# Patient Record
Sex: MALE | Race: WHITE | Employment: FULL TIME | URBAN - METROPOLITAN AREA
[De-identification: names, ages, dates, MRNs, and addresses within clinical notes are randomized per-mention and may not be internally consistent; named-entity substitution may affect disease eponyms.]

---

## 2019-03-26 ENCOUNTER — APPOINTMENT (OUTPATIENT)
Dept: GENERAL RADIOLOGY | Age: 64
DRG: 084 | End: 2019-03-26
Payer: COMMERCIAL

## 2019-03-26 ENCOUNTER — APPOINTMENT (OUTPATIENT)
Dept: CT IMAGING | Age: 64
DRG: 084 | End: 2019-03-26
Payer: COMMERCIAL

## 2019-03-26 ENCOUNTER — HOSPITAL ENCOUNTER (INPATIENT)
Age: 64
LOS: 7 days | Discharge: HOME | DRG: 084 | End: 2019-04-02
Attending: EMERGENCY MEDICINE | Admitting: SURGERY
Payer: COMMERCIAL

## 2019-03-26 DIAGNOSIS — R42 DIZZINESS: ICD-10-CM

## 2019-03-26 DIAGNOSIS — R55 SYNCOPE AND COLLAPSE: ICD-10-CM

## 2019-03-26 DIAGNOSIS — S06.5XAA SUBDURAL HEMATOMA: Primary | ICD-10-CM

## 2019-03-26 DIAGNOSIS — S02.119A FRACTURE OF OCCIPITAL BONE OF SKULL WITH LOSS OF CONSCIOUSNESS (HCC): ICD-10-CM

## 2019-03-26 DIAGNOSIS — S06.9X9A FRACTURE OF OCCIPITAL BONE OF SKULL WITH LOSS OF CONSCIOUSNESS (HCC): ICD-10-CM

## 2019-03-26 LAB
ANION GAP SERPL CALCULATED.3IONS-SCNC: 12 MMOL/L (ref 7–16)
APTT: 25.6 SEC (ref 24.5–35.1)
BASOPHILS ABSOLUTE: 0 E9/L (ref 0–0.2)
BASOPHILS RELATIVE PERCENT: 0.3 % (ref 0–2)
BUN BLDV-MCNC: 13 MG/DL (ref 8–23)
CALCIUM SERPL-MCNC: 9 MG/DL (ref 8.6–10.2)
CHLORIDE BLD-SCNC: 101 MMOL/L (ref 98–107)
CO2: 24 MMOL/L (ref 22–29)
CREAT SERPL-MCNC: 0.8 MG/DL (ref 0.7–1.2)
EOSINOPHILS ABSOLUTE: 0 E9/L (ref 0.05–0.5)
EOSINOPHILS RELATIVE PERCENT: 0 % (ref 0–6)
GFR AFRICAN AMERICAN: >60
GFR NON-AFRICAN AMERICAN: >60 ML/MIN/1.73
GLUCOSE BLD-MCNC: 156 MG/DL (ref 74–99)
HCT VFR BLD CALC: 43.3 % (ref 37–54)
HEMOGLOBIN: 14.5 G/DL (ref 12.5–16.5)
INR BLD: 1
LACTIC ACID: 1.9 MMOL/L (ref 0.5–2.2)
LYMPHOCYTES ABSOLUTE: 0.42 E9/L (ref 1.5–4)
LYMPHOCYTES RELATIVE PERCENT: 4.3 % (ref 20–42)
MAGNESIUM: 2 MG/DL (ref 1.6–2.6)
MCH RBC QN AUTO: 31.3 PG (ref 26–35)
MCHC RBC AUTO-ENTMCNC: 33.5 % (ref 32–34.5)
MCV RBC AUTO: 93.5 FL (ref 80–99.9)
MONOCYTES ABSOLUTE: 0.42 E9/L (ref 0.1–0.95)
MONOCYTES RELATIVE PERCENT: 3.5 % (ref 2–12)
NEUTROPHILS ABSOLUTE: 9.66 E9/L (ref 1.8–7.3)
NEUTROPHILS RELATIVE PERCENT: 92.2 % (ref 43–80)
PDW BLD-RTO: 13.1 FL (ref 11.5–15)
PLATELET # BLD: 232 E9/L (ref 130–450)
PMV BLD AUTO: 10.1 FL (ref 7–12)
POTASSIUM REFLEX MAGNESIUM: 3.5 MMOL/L (ref 3.5–5)
PROTHROMBIN TIME: 11.4 SEC (ref 9.3–12.4)
RBC # BLD: 4.63 E12/L (ref 3.8–5.8)
SODIUM BLD-SCNC: 137 MMOL/L (ref 132–146)
TROPONIN: <0.01 NG/ML (ref 0–0.03)
WBC # BLD: 10.5 E9/L (ref 4.5–11.5)

## 2019-03-26 PROCEDURE — 72125 CT NECK SPINE W/O DYE: CPT

## 2019-03-26 PROCEDURE — 96375 TX/PRO/DX INJ NEW DRUG ADDON: CPT

## 2019-03-26 PROCEDURE — 2500000003 HC RX 250 WO HCPCS: Performed by: STUDENT IN AN ORGANIZED HEALTH CARE EDUCATION/TRAINING PROGRAM

## 2019-03-26 PROCEDURE — 6360000002 HC RX W HCPCS: Performed by: EMERGENCY MEDICINE

## 2019-03-26 PROCEDURE — 6370000000 HC RX 637 (ALT 250 FOR IP): Performed by: SURGERY

## 2019-03-26 PROCEDURE — 96365 THER/PROPH/DIAG IV INF INIT: CPT

## 2019-03-26 PROCEDURE — 90471 IMMUNIZATION ADMIN: CPT | Performed by: STUDENT IN AN ORGANIZED HEALTH CARE EDUCATION/TRAINING PROGRAM

## 2019-03-26 PROCEDURE — 73070 X-RAY EXAM OF ELBOW: CPT

## 2019-03-26 PROCEDURE — 80048 BASIC METABOLIC PNL TOTAL CA: CPT

## 2019-03-26 PROCEDURE — 85610 PROTHROMBIN TIME: CPT

## 2019-03-26 PROCEDURE — 99291 CRITICAL CARE FIRST HOUR: CPT | Performed by: SURGERY

## 2019-03-26 PROCEDURE — 85730 THROMBOPLASTIN TIME PARTIAL: CPT

## 2019-03-26 PROCEDURE — 84484 ASSAY OF TROPONIN QUANT: CPT

## 2019-03-26 PROCEDURE — 96376 TX/PRO/DX INJ SAME DRUG ADON: CPT

## 2019-03-26 PROCEDURE — 71045 X-RAY EXAM CHEST 1 VIEW: CPT

## 2019-03-26 PROCEDURE — 6360000002 HC RX W HCPCS: Performed by: STUDENT IN AN ORGANIZED HEALTH CARE EDUCATION/TRAINING PROGRAM

## 2019-03-26 PROCEDURE — 99254 IP/OBS CNSLTJ NEW/EST MOD 60: CPT | Performed by: NEUROLOGICAL SURGERY

## 2019-03-26 PROCEDURE — 90715 TDAP VACCINE 7 YRS/> IM: CPT | Performed by: STUDENT IN AN ORGANIZED HEALTH CARE EDUCATION/TRAINING PROGRAM

## 2019-03-26 PROCEDURE — 70450 CT HEAD/BRAIN W/O DYE: CPT

## 2019-03-26 PROCEDURE — 72170 X-RAY EXAM OF PELVIS: CPT

## 2019-03-26 PROCEDURE — 99285 EMERGENCY DEPT VISIT HI MDM: CPT

## 2019-03-26 PROCEDURE — 83605 ASSAY OF LACTIC ACID: CPT

## 2019-03-26 PROCEDURE — 83735 ASSAY OF MAGNESIUM: CPT

## 2019-03-26 PROCEDURE — 36415 COLL VENOUS BLD VENIPUNCTURE: CPT

## 2019-03-26 PROCEDURE — 2000000000 HC ICU R&B

## 2019-03-26 PROCEDURE — 6370000000 HC RX 637 (ALT 250 FOR IP): Performed by: STUDENT IN AN ORGANIZED HEALTH CARE EDUCATION/TRAINING PROGRAM

## 2019-03-26 PROCEDURE — 2580000003 HC RX 258: Performed by: SURGERY

## 2019-03-26 PROCEDURE — 85025 COMPLETE CBC W/AUTO DIFF WBC: CPT

## 2019-03-26 PROCEDURE — 2580000003 HC RX 258: Performed by: STUDENT IN AN ORGANIZED HEALTH CARE EDUCATION/TRAINING PROGRAM

## 2019-03-26 PROCEDURE — 87081 CULTURE SCREEN ONLY: CPT

## 2019-03-26 PROCEDURE — 93005 ELECTROCARDIOGRAM TRACING: CPT | Performed by: STUDENT IN AN ORGANIZED HEALTH CARE EDUCATION/TRAINING PROGRAM

## 2019-03-26 RX ORDER — LABETALOL HYDROCHLORIDE 5 MG/ML
10 INJECTION, SOLUTION INTRAVENOUS ONCE
Status: COMPLETED | OUTPATIENT
Start: 2019-03-26 | End: 2019-03-26

## 2019-03-26 RX ORDER — 0.9 % SODIUM CHLORIDE 0.9 %
1000 INTRAVENOUS SOLUTION INTRAVENOUS ONCE
Status: COMPLETED | OUTPATIENT
Start: 2019-03-26 | End: 2019-03-26

## 2019-03-26 RX ORDER — OXYCODONE HYDROCHLORIDE AND ACETAMINOPHEN 5; 325 MG/1; MG/1
2 TABLET ORAL EVERY 4 HOURS PRN
Status: DISCONTINUED | OUTPATIENT
Start: 2019-03-26 | End: 2019-03-26

## 2019-03-26 RX ORDER — SODIUM CHLORIDE 0.9 % (FLUSH) 0.9 %
10 SYRINGE (ML) INJECTION EVERY 12 HOURS SCHEDULED
Status: DISCONTINUED | OUTPATIENT
Start: 2019-03-26 | End: 2019-04-02 | Stop reason: HOSPADM

## 2019-03-26 RX ORDER — SODIUM CHLORIDE 9 MG/ML
INJECTION, SOLUTION INTRAVENOUS CONTINUOUS
Status: DISCONTINUED | OUTPATIENT
Start: 2019-03-26 | End: 2019-03-27

## 2019-03-26 RX ORDER — FOLIC ACID 5 MG/ML
1 INJECTION, SOLUTION INTRAMUSCULAR; INTRAVENOUS; SUBCUTANEOUS DAILY
Status: DISCONTINUED | OUTPATIENT
Start: 2019-03-26 | End: 2019-04-02 | Stop reason: HOSPADM

## 2019-03-26 RX ORDER — NICOTINE 21 MG/24HR
1 PATCH, TRANSDERMAL 24 HOURS TRANSDERMAL DAILY
Status: DISCONTINUED | OUTPATIENT
Start: 2019-03-26 | End: 2019-04-02 | Stop reason: HOSPADM

## 2019-03-26 RX ORDER — ACETAMINOPHEN 325 MG/1
650 TABLET ORAL EVERY 4 HOURS
Status: DISCONTINUED | OUTPATIENT
Start: 2019-03-26 | End: 2019-03-31

## 2019-03-26 RX ORDER — OXYCODONE HYDROCHLORIDE 5 MG/1
5 TABLET ORAL EVERY 4 HOURS PRN
Status: DISCONTINUED | OUTPATIENT
Start: 2019-03-26 | End: 2019-04-02 | Stop reason: HOSPADM

## 2019-03-26 RX ORDER — LEVETIRACETAM 10 MG/ML
1000 INJECTION INTRAVASCULAR ONCE
Status: COMPLETED | OUTPATIENT
Start: 2019-03-26 | End: 2019-03-26

## 2019-03-26 RX ORDER — IBUPROFEN 400 MG/1
800 TABLET ORAL
Status: DISCONTINUED | OUTPATIENT
Start: 2019-03-26 | End: 2019-03-27

## 2019-03-26 RX ORDER — ONDANSETRON 2 MG/ML
4 INJECTION INTRAMUSCULAR; INTRAVENOUS EVERY 6 HOURS PRN
Status: DISCONTINUED | OUTPATIENT
Start: 2019-03-26 | End: 2019-04-02 | Stop reason: HOSPADM

## 2019-03-26 RX ORDER — THIAMINE HYDROCHLORIDE 100 MG/ML
100 INJECTION, SOLUTION INTRAMUSCULAR; INTRAVENOUS DAILY
Status: DISCONTINUED | OUTPATIENT
Start: 2019-03-26 | End: 2019-04-02 | Stop reason: HOSPADM

## 2019-03-26 RX ORDER — LEVETIRACETAM 500 MG/1
500 TABLET ORAL 2 TIMES DAILY
Status: DISCONTINUED | OUTPATIENT
Start: 2019-03-27 | End: 2019-03-27

## 2019-03-26 RX ORDER — ACETAMINOPHEN 325 MG/1
650 TABLET ORAL EVERY 4 HOURS PRN
Status: DISCONTINUED | OUTPATIENT
Start: 2019-03-26 | End: 2019-03-26

## 2019-03-26 RX ORDER — SODIUM CHLORIDE 0.9 % (FLUSH) 0.9 %
10 SYRINGE (ML) INJECTION PRN
Status: DISCONTINUED | OUTPATIENT
Start: 2019-03-26 | End: 2019-04-02 | Stop reason: HOSPADM

## 2019-03-26 RX ORDER — OXYCODONE HYDROCHLORIDE AND ACETAMINOPHEN 5; 325 MG/1; MG/1
1 TABLET ORAL EVERY 4 HOURS PRN
Status: DISCONTINUED | OUTPATIENT
Start: 2019-03-26 | End: 2019-03-26

## 2019-03-26 RX ADMIN — LEVETIRACETAM 1000 MG: 10 INJECTION INTRAVENOUS at 16:26

## 2019-03-26 RX ADMIN — SODIUM CHLORIDE: 9 INJECTION, SOLUTION INTRAVENOUS at 18:01

## 2019-03-26 RX ADMIN — OXYCODONE HYDROCHLORIDE 5 MG: 5 TABLET ORAL at 22:18

## 2019-03-26 RX ADMIN — OXYCODONE AND ACETAMINOPHEN 2 TABLET: 5; 325 TABLET ORAL at 18:09

## 2019-03-26 RX ADMIN — THIAMINE HYDROCHLORIDE 100 MG: 100 INJECTION, SOLUTION INTRAMUSCULAR; INTRAVENOUS at 19:39

## 2019-03-26 RX ADMIN — ACETAMINOPHEN 650 MG: 325 TABLET, FILM COATED ORAL at 23:59

## 2019-03-26 RX ADMIN — ACETAMINOPHEN 650 MG: 325 TABLET, FILM COATED ORAL at 19:39

## 2019-03-26 RX ADMIN — LABETALOL HYDROCHLORIDE 10 MG: 5 INJECTION INTRAVENOUS at 17:10

## 2019-03-26 RX ADMIN — FOLIC ACID 1 MG: 5 INJECTION, SOLUTION INTRAMUSCULAR; INTRAVENOUS; SUBCUTANEOUS at 21:27

## 2019-03-26 RX ADMIN — SODIUM CHLORIDE 1000 ML: 9 INJECTION, SOLUTION INTRAVENOUS at 16:27

## 2019-03-26 RX ADMIN — LABETALOL HYDROCHLORIDE 10 MG: 5 INJECTION INTRAVENOUS at 16:26

## 2019-03-26 RX ADMIN — TETANUS TOXOID, REDUCED DIPHTHERIA TOXOID AND ACELLULAR PERTUSSIS VACCINE, ADSORBED 0.5 ML: 5; 2.5; 8; 8; 2.5 SUSPENSION INTRAMUSCULAR at 17:17

## 2019-03-26 ASSESSMENT — ENCOUNTER SYMPTOMS
TROUBLE SWALLOWING: 0
COLOR CHANGE: 0
EYE PAIN: 0
BACK PAIN: 0
PHOTOPHOBIA: 0
SHORTNESS OF BREATH: 0
ABDOMINAL PAIN: 0
CHEST TIGHTNESS: 0
ABDOMINAL DISTENTION: 0

## 2019-03-26 ASSESSMENT — PAIN SCALES - GENERAL
PAINLEVEL_OUTOF10: 0
PAINLEVEL_OUTOF10: 7
PAINLEVEL_OUTOF10: 7
PAINLEVEL_OUTOF10: 6
PAINLEVEL_OUTOF10: 8

## 2019-03-26 ASSESSMENT — PAIN DESCRIPTION - DESCRIPTORS: DESCRIPTORS: ACHING;DISCOMFORT;HEADACHE

## 2019-03-26 ASSESSMENT — PAIN DESCRIPTION - LOCATION: LOCATION: HEAD;SHOULDER

## 2019-03-26 NOTE — H&P
TRAUMA HISTORY & PHYSICAL  Resident   3/26/2019  4:44 PM    Chief Complaint   Patient presents with    Dizziness     was at work when he was changing a part on a car when the next thing he remembers is waking up on the ground. - thinners +loc         HPI: Talon Villagran is a 66 y.o. male who presents as a trauma consult from home for evaluation of SDH. He was working on his truck when he had a syncopal episode and woke up on the ground. He does not know what happened. He complains of 7/10 headache, nausea and left elbow pain. He denies any blood thinners. He does have a history of prostate cancer, benign tumor in chest? And kidney surgery as a child.       PRIMARY SURVEY    AIRWAY:   Airway normal  EMS ETT Absent   Noisy respirations Absent   Retractions: Absent   Vomiting/bleeding: Absent     BREATHING:    Midaxillary breath sound left:  Present  Midaxillary breath sound right: Present  Cough sound intensity:  Good  Respiratory rate: 16  FiO2: RA  SpO2: 96  SMI:     CIRCULATION:   Femerol pulse rate: within normal limits  Femerol pulse intensity: present  Palpebral conjunctiva: Pink     BP      P     SpO2       T      F    Patient Vitals for the past 8 hrs:   BP Temp Pulse Resp SpO2 Height Weight   03/26/19 1624 (!) 166/107 -- 101 22 96 % -- --   03/26/19 1518 (!) 184/112 97.8 °F (36.6 °C) 112 18 94 % 5' 7\" (1.702 m) 230 lb (104.3 kg)       FAST EXAM: Not performed    Central Nervous System    GCS Initial 15 minutes   Eye  Motor  Verbal 4 - Opens eyes on own  6 - Follows simple motor commands  5 - Alert and oriented 4 - Opens eyes on own  6 - Follows simple motor commands  5 - Alert and oriented     Neuromuscular blockade: No  Pupil size:  Left 3 mm    Right 3 mm  Pupil reaction: Yes  Wiggles fingers: Left Yes Right Yes  Wiggles toes: Left Yes    Right Yes    Hand grasp:   Left normal       Right normal  Plantar flexion: Left normal     Right normal  Loss of consciousness: Yes    History Obtained From:  patient  Private Medical Doctor: No primary care provider on file. Pre-exisiting Medical History:  yes    Conditions:  has no past medical history on file. Medications:   Prior to Admission medications    Not on File       Allergies: Allergies   Allergen Reactions    Pcn [Penicillins]        Social History:   Social History     Tobacco Use    Smoking status: Current Every Day Smoker    Smokeless tobacco: Never Used   Substance Use Topics    Alcohol use: Yes       Past Surgical History:   has no past surgical history on file.     NSAID use in last 72 hours: no  Taken PCN in past:  unknown  Last food/drink: AM  Last tetanus: unknown    Complaints:     Head: moderate  Neck: mild  Chest: no  Back: no  Abdomen: no  Extremities: L elbow pain  Comments:       SECONDARY SURVEY  Head/scalp: small abrasion to posterior scalp    Face: No soft tissue injuries    Eyes/ears/nose: EOMI, PEERL, no soft tissue injuries    Pharynx/mouth:  No soft tissue injury, no malocclusion    Neck: No soft tissue injuries   Cervical spine tenderness: mild  ROM:  Cervical collar placed    Chest wall:  CTAB, no crepitus appreciated, no soft tissue injuries     Heart:  RRR    Abdomen: Soft, non-distended, no soft tissue injuries   Tenderness:  none    Pelvis: Stable, no soft tissue injuries, no pain with hip flexion   Tenderness: none    Thoracolumbar spine: No soft tissue injuries, no step-offs  Tenderness:  none    Extremities: left le  Sensory normal  Motor normal    Distal Pulses  Left arm Normal  Right arm Normal  Left leg Normal  Right leg Normal    Capillary refill   Left arm normal  Right arm normal  Left leg normal   Right leg normal      Procedures in ED:  None      Radiology:     Xr Elbow Left (2 Views)    Result Date: 3/26/2019  Patient MRN:  [id number] : 1955 Age: 66 years Gender: Male Order Date:  3/26/2019 3:45 PM EXAM: XR ELBOW LEFT (2 VIEWS) NUMBER OF IMAGES:  2 INDICATION:  Left elbow pain following fall with syncopal episode Fulle    Plan of Treatment:  Admit to Neuro ICU  Repeat head CT  CT neck  Pelvic x-ray  INR, TEG, Platelet mapping    Plan discussed with Dr. Silvia Haskins.     Madhuri Guerra on 3/26/2019 at 4:44 PM

## 2019-03-26 NOTE — ED NOTES
Bed: 14B-14  Expected date:   Expected time:   Means of arrival:   Comments:     Nia Tobin RN  03/26/19 9893

## 2019-03-26 NOTE — ED PROVIDER NOTES
Negative for abdominal distention and abdominal pain. Genitourinary: Negative for dysuria and flank pain. Musculoskeletal: Negative for back pain, neck pain and neck stiffness. Skin: Negative for color change, pallor and rash. Neurological: Positive for dizziness, syncope and headaches. Negative for tremors, seizures, speech difficulty, weakness and light-headedness. Psychiatric/Behavioral: Negative for agitation and confusion. Physical Exam   Constitutional: He is oriented to person, place, and time. He appears well-developed and well-nourished. No distress. HENT:   Head: Normocephalic. Nose: Nose normal.   Mouth/Throat: Oropharynx is clear and moist.   Hematoma to posterior scalp   Eyes: Conjunctivae and EOM are normal.   Neck: Neck supple. Cardiovascular: Regular rhythm and intact distal pulses. Tachycardic  Scar on sternum from benign tumor removal   Pulmonary/Chest: Effort normal and breath sounds normal. No respiratory distress. He has no wheezes. He exhibits no tenderness. Abdominal: Soft. Bowel sounds are normal. He exhibits distension. There is no tenderness. There is no rebound and no guarding. Musculoskeletal: He exhibits tenderness (left elbow ). He exhibits no edema or deformity. Normal ROM UE/LE     Neurological: He is alert and oriented to person, place, and time. No cranial nerve deficit or sensory deficit. He exhibits normal muscle tone. Coordination normal.   Skin: Skin is warm and dry. Capillary refill takes less than 2 seconds. He is not diaphoretic. Psychiatric: He has a normal mood and affect. His behavior is normal.   Nursing note and vitals reviewed. Procedures    MDM  Number of Diagnoses or Management Options  Dizziness:   Subdural hematoma [facility]):   Syncope and collapse:   Diagnosis management comments: Lettie Sacks presented following syncopal episode. Patient was found to have subdural hematoma with midline shift.  Patient was given labetalol 10mg x2 in ED, BP was 155/96 in ED just after second labetalol before transfer to the ICU. Patient was given 1g Keppra. Patient will be admitted to trauma surgery in SICU and Dr. Brittanie Jeong will consult. Patient has been stable in ED without change in neruo status in ED. Results discussed with patient.               --------------------------------------------- PAST HISTORY ---------------------------------------------  Past Medical History:  has no past medical history on file. Past Surgical History:  has no past surgical history on file. Social History:  reports that he has been smoking. He has never used smokeless tobacco. He reports that he drinks alcohol. Family History: family history is not on file. The patients home medications have been reviewed. Allergies: Pcn [penicillins]    -------------------------------------------------- RESULTS -------------------------------------------------    LABS:  Results for orders placed or performed during the hospital encounter of 03/26/19   CBC auto differential   Result Value Ref Range    WBC 10.5 4.5 - 11.5 E9/L    RBC 4.63 3.80 - 5.80 E12/L    Hemoglobin 14.5 12.5 - 16.5 g/dL    Hematocrit 43.3 37.0 - 54.0 %    MCV 93.5 80.0 - 99.9 fL    MCH 31.3 26.0 - 35.0 pg    MCHC 33.5 32.0 - 34.5 %    RDW 13.1 11.5 - 15.0 fL    Platelets 644 939 - 398 E9/L    MPV 10.1 7.0 - 12.0 fL   EKG 12 Lead   Result Value Ref Range    Ventricular Rate 96 BPM    Atrial Rate 96 BPM    P-R Interval 156 ms    QRS Duration 88 ms    Q-T Interval 362 ms    QTc Calculation (Bazett) 457 ms    P Axis 49 degrees    R Axis 12 degrees    T Axis -27 degrees       RADIOLOGY:  CT Head WO Contrast   Final Result      1. Right frontal and temporal subdural hematoma with leftward midline   shift of 7 mm. 2. Nondepressed occipital bone fracture. Findings called to Dr. Lay Counts at 1900 [facility] PM 3/26/2019. ALERT:  CRITICAL RESULT.       XR CHEST PORTABLE    (Results Pending)   XR ELBOW LEFT (2 VIEWS) (Results Pending)       EKG: This EKG is signed and interpreted by me. Rate: 96  Rhythm: Sinus  Interpretation: possible left atrial enlargement ST/T abnormality   Comparison: no previous EKG      ------------------------- NURSING NOTES AND VITALS REVIEWED ---------------------------  Date / Time Roomed:  3/26/2019  3:17 PM  ED Bed Assignment:  14B/14B-14    The nursing notes within the ED encounter and vital signs as below have been reviewed. Patient Vitals for the past 24 hrs:   BP Temp Pulse Resp SpO2 Height Weight   03/26/19 1624 (!) 166/107 -- 101 22 96 % -- --   03/26/19 1518 (!) 184/112 97.8 °F (36.6 °C) 112 18 94 % 5' 7\" (1.702 m) 230 lb (104.3 kg)       Oxygen Saturation Interpretation: Normal    ------------------------------------------ PROGRESS NOTES ------------------------------------------  Re-evaluation(s):  Time: 425pm  Patients symptoms show no change  Repeat physical examination is not changed    Counseling:  I have spoken with the patient and discussed todays results, in addition to providing specific details for the plan of care and counseling regarding the diagnosis and prognosis. Their questions are answered at this time and they are agreeable with the plan of admission.    --------------------------------- ADDITIONAL PROVIDER NOTES ---------------------------------  Consultations:  Spoke with Dr. Robby Kamara, neurosurgery and Trauma service. Discussed case. They will admit the patient. This patient's ED course included: a personal history and physicial examination, re-evaluation prior to disposition, multiple bedside re-evaluations, IV medications, cardiac monitoring and continuous pulse oximetry    This patient has remained hemodynamically stable during their ED course. Diagnosis:  1. Subdural hematoma ([address]    2. Syncope and collapse    3.  Dizziness      Please note that the withdrawal or failure to initiate urgent interventions for this patient would likely result in a life threatening deterioration or permanent disability. Accordingly this patient received 35 minutes of critical care time, excluding separately billable procedures. Disposition:  Patient's disposition: Admit to neuro-ICU  Patient's condition is stable.               Airam Prather MD  03/26/19 8975

## 2019-03-26 NOTE — CONSULTS
NEUROSURGERY CONSULTATION     Lazarus Kansky is being referred by Dr. Devi Trujillo as a consultation for evaluation of SDH after fall     Chief Complaint   Patient presents with    Dizziness     was at work when he was changing a part on a car when the next thing he remembers is waking up on the ground. - thinners +loc       Chief Complaint: SDH, dizziness    HPI:   Lazarus Kansky is a 66 y.o.  male who has history of prostate cancer x 2 years ago s/p surgery and radiation and hx benign tumor removed from his chest 10 years ago. Pt presents to the ED after a syncopal episode when he was working on his truck. He is c/o HA, left elbow pain, and left jaw pain. Complaint is persistent, moderate in severity, and worsened by nothing. Pt states he remembers waking up in his truck but doesn't know how he got there. He is not on any anticoagulation. CT head demonstrates an 8mm thick right frontal and temporal SDH with sulcal effacement and 7mm midline shift for which neurosurgery was consulted. Denies weakness, numbness, tingling, N/V, gait or speech issues, SOB, or chest pain. History reviewed. No pertinent past medical history. History reviewed. No pertinent surgical history. History reviewed. No pertinent family history. Social History     Socioeconomic History    Marital status: Single     Spouse name: Not on file    Number of children: Not on file    Years of education: Not on file    Highest education level: Not on file   Occupational History    Not on file   Social Needs    Financial resource strain: Not on file    Food insecurity:     Worry: Not on file     Inability: Not on file    Transportation needs:     Medical: Not on file     Non-medical: Not on file   Tobacco Use    Smoking status: Current Every Day Smoker    Smokeless tobacco: Never Used   Substance and Sexual Activity    Alcohol use:  Yes    Drug use: Not on file    Sexual activity: Not on file   Lifestyle    Physical activity:     Days per week: Not on file     Minutes per session: Not on file    Stress: Not on file   Relationships    Social connections:     Talks on phone: Not on file     Gets together: Not on file     Attends Islam service: Not on file     Active member of club or organization: Not on file     Attends meetings of clubs or organizations: Not on file     Relationship status: Not on file    Intimate partner violence:     Fear of current or ex partner: Not on file     Emotionally abused: Not on file     Physically abused: Not on file     Forced sexual activity: Not on file   Other Topics Concern    Not on file   Social History Narrative    Not on file       Medications:   Current Facility-Administered Medications   Medication Dose Route Frequency Provider Last Rate Last Dose    [START ON 3/27/2019] levETIRAcetam (KEPPRA) tablet 500 mg  500 mg Oral BID Kong Reddy MD         No current outpatient medications on file. Allergies:    Pcn [penicillins]       Review of Systems   Constitutional: Negative for fever. HENT: Negative for trouble swallowing. Eyes: Negative for photophobia. Respiratory: Negative for shortness of breath. Cardiovascular: Negative for chest pain. Gastrointestinal: Negative for abdominal pain. Endocrine: Negative for heat intolerance. Genitourinary: Negative for flank pain. Musculoskeletal: Negative for back pain, gait problem, myalgias and neck pain. Left elbow pain    Skin: Negative for wound. Neurological: Positive for dizziness, syncope and headaches. Negative for weakness and numbness. Psychiatric/Behavioral: Negative for confusion. Physical Exam   Constitutional: He appears well-developed and well-nourished. HENT:   Head: Normocephalic. Eyes: Pupils are equal, round, and reactive to light. Conjunctivae and EOM are normal.   Neck: No tracheal deviation present. Currently in trauma C collar   Cardiovascular: Normal rate.    Pulmonary/Chest: Effort normal. Abdominal: He exhibits no distension. Musculoskeletal: Normal range of motion. Left elbow tenderness to palpation    Neurological:   Alert and oriented x3  CN3-12 intact  No pronator drift  Motor strength full  Sensation intact to light touch  Reflexes normal    Skin: Skin is warm and dry. Psychiatric: Thought content normal.        BP (!) 166/107   Pulse 101   Temp 97.8 °F (36.6 °C)   Resp 22   Ht 5' 7\" (1.702 m)   Wt 230 lb (104.3 kg)   SpO2 96%   BMI 36.02 kg/m²        Assessment:   New problem: right frontal-temporal SDH s/p fall    3/26 HCT: right frontal and temporal SDH measuring 8mm thick with local sulcal effacement and 7mm midline shift. The basal cisterns are patent. Non-depressed occipital bone fx. He is currently asymptomatic from the subdural hemorrhage. Plan:  -No surgical intervention needed if neurological exam remains without any deficits. -SBP goal < 140   -Repeat head CT in 6 hours  -Serial neurological exams  -Follow up in neurosurgery clinic in 1 month with repeat head CT   -keppra 500mg bid for week. -he is currently in a trauma C collar. Obtain CT C spine. Clear collar clinically if no fx on CT C spine.   -check INR/PTT      Electronically signed by Sarah Magana PA-C on 3/26/2019 at 5:23 PM       I independently saw, evaluated, and examined the patient. Agree with plan outlined above. I independently reviewed the patient's imaging and laboratory results. Dionne Colunga is 66years old. He presented with a syncopal episode. He was found passed out in his truck. He hit the back of his head. He suffered an occipital bone fracture. He has a right frontotemporal subdural hematoma measuring 8 mm in thickness. There is 7 mm of midline shift. The basal cisterns are patent. He is currently neurologically intact. No current surgical intervention is planned. Continue to follow exam.  He states that he was not taking any anticoagulation or any antiplatelet agents. Platelets 271.  Electronically signed by Chayito Barrera MD on 3/26/2019 at 8:28 PM

## 2019-03-27 ENCOUNTER — APPOINTMENT (OUTPATIENT)
Dept: GENERAL RADIOLOGY | Age: 64
DRG: 084 | End: 2019-03-27
Payer: COMMERCIAL

## 2019-03-27 LAB
ALBUMIN SERPL-MCNC: 3.8 G/DL (ref 3.5–5.2)
ALP BLD-CCNC: 67 U/L (ref 40–129)
ALT SERPL-CCNC: 11 U/L (ref 0–40)
ANION GAP SERPL CALCULATED.3IONS-SCNC: 11 MMOL/L (ref 7–16)
AST SERPL-CCNC: 18 U/L (ref 0–39)
BASOPHILS ABSOLUTE: 0.01 E9/L (ref 0–0.2)
BASOPHILS RELATIVE PERCENT: 0.2 % (ref 0–2)
BILIRUB SERPL-MCNC: 0.5 MG/DL (ref 0–1.2)
BUN BLDV-MCNC: 10 MG/DL (ref 8–23)
CALCIUM IONIZED: 1.16 MMOL/L (ref 1.15–1.33)
CALCIUM SERPL-MCNC: 8.3 MG/DL (ref 8.6–10.2)
CHLORIDE BLD-SCNC: 102 MMOL/L (ref 98–107)
CO2: 24 MMOL/L (ref 22–29)
CREAT SERPL-MCNC: 0.8 MG/DL (ref 0.7–1.2)
EOSINOPHILS ABSOLUTE: 0.1 E9/L (ref 0.05–0.5)
EOSINOPHILS RELATIVE PERCENT: 2 % (ref 0–6)
GFR AFRICAN AMERICAN: >60
GFR NON-AFRICAN AMERICAN: >60 ML/MIN/1.73
GLUCOSE BLD-MCNC: 112 MG/DL (ref 74–99)
HCT VFR BLD CALC: 38.2 % (ref 37–54)
HEMOGLOBIN: 12.6 G/DL (ref 12.5–16.5)
IMMATURE GRANULOCYTES #: 0.02 E9/L
IMMATURE GRANULOCYTES %: 0.4 % (ref 0–5)
LV EF: 65 %
LVEF MODALITY: NORMAL
LYMPHOCYTES ABSOLUTE: 0.73 E9/L (ref 1.5–4)
LYMPHOCYTES RELATIVE PERCENT: 14.7 % (ref 20–42)
MAGNESIUM: 2 MG/DL (ref 1.6–2.6)
MCH RBC QN AUTO: 31.5 PG (ref 26–35)
MCHC RBC AUTO-ENTMCNC: 33 % (ref 32–34.5)
MCV RBC AUTO: 95.5 FL (ref 80–99.9)
MONOCYTES ABSOLUTE: 0.68 E9/L (ref 0.1–0.95)
MONOCYTES RELATIVE PERCENT: 13.7 % (ref 2–12)
NEUTROPHILS ABSOLUTE: 3.44 E9/L (ref 1.8–7.3)
NEUTROPHILS RELATIVE PERCENT: 69 % (ref 43–80)
PDW BLD-RTO: 13.2 FL (ref 11.5–15)
PHOSPHORUS: 2.9 MG/DL (ref 2.5–4.5)
PLATELET # BLD: 216 E9/L (ref 130–450)
PMV BLD AUTO: 10.5 FL (ref 7–12)
POTASSIUM REFLEX MAGNESIUM: 3.4 MMOL/L (ref 3.5–5)
RBC # BLD: 4 E12/L (ref 3.8–5.8)
SODIUM BLD-SCNC: 137 MMOL/L (ref 132–146)
TOTAL PROTEIN: 6.6 G/DL (ref 6.4–8.3)
WBC # BLD: 5 E9/L (ref 4.5–11.5)

## 2019-03-27 PROCEDURE — 36415 COLL VENOUS BLD VENIPUNCTURE: CPT

## 2019-03-27 PROCEDURE — 82330 ASSAY OF CALCIUM: CPT

## 2019-03-27 PROCEDURE — 6370000000 HC RX 637 (ALT 250 FOR IP): Performed by: STUDENT IN AN ORGANIZED HEALTH CARE EDUCATION/TRAINING PROGRAM

## 2019-03-27 PROCEDURE — 2500000003 HC RX 250 WO HCPCS: Performed by: EMERGENCY MEDICINE

## 2019-03-27 PROCEDURE — 84100 ASSAY OF PHOSPHORUS: CPT

## 2019-03-27 PROCEDURE — 99232 SBSQ HOSP IP/OBS MODERATE 35: CPT | Performed by: NEUROLOGICAL SURGERY

## 2019-03-27 PROCEDURE — 6370000000 HC RX 637 (ALT 250 FOR IP): Performed by: SURGERY

## 2019-03-27 PROCEDURE — 2500000003 HC RX 250 WO HCPCS: Performed by: STUDENT IN AN ORGANIZED HEALTH CARE EDUCATION/TRAINING PROGRAM

## 2019-03-27 PROCEDURE — 80053 COMPREHEN METABOLIC PANEL: CPT

## 2019-03-27 PROCEDURE — 99291 CRITICAL CARE FIRST HOUR: CPT | Performed by: SURGERY

## 2019-03-27 PROCEDURE — 97161 PT EVAL LOW COMPLEX 20 MIN: CPT

## 2019-03-27 PROCEDURE — 2000000000 HC ICU R&B

## 2019-03-27 PROCEDURE — 6360000002 HC RX W HCPCS: Performed by: STUDENT IN AN ORGANIZED HEALTH CARE EDUCATION/TRAINING PROGRAM

## 2019-03-27 PROCEDURE — 74018 RADEX ABDOMEN 1 VIEW: CPT

## 2019-03-27 PROCEDURE — 83735 ASSAY OF MAGNESIUM: CPT

## 2019-03-27 PROCEDURE — 93306 TTE W/DOPPLER COMPLETE: CPT

## 2019-03-27 PROCEDURE — 6360000002 HC RX W HCPCS: Performed by: EMERGENCY MEDICINE

## 2019-03-27 PROCEDURE — 6360000002 HC RX W HCPCS: Performed by: SURGERY

## 2019-03-27 PROCEDURE — 85025 COMPLETE CBC W/AUTO DIFF WBC: CPT

## 2019-03-27 PROCEDURE — 93306 TTE W/DOPPLER COMPLETE: CPT | Performed by: INTERNAL MEDICINE

## 2019-03-27 PROCEDURE — 76937 US GUIDE VASCULAR ACCESS: CPT

## 2019-03-27 PROCEDURE — 2580000003 HC RX 258: Performed by: SURGERY

## 2019-03-27 RX ORDER — MORPHINE SULFATE 2 MG/ML
2 INJECTION, SOLUTION INTRAMUSCULAR; INTRAVENOUS EVERY 4 HOURS PRN
Status: DISCONTINUED | OUTPATIENT
Start: 2019-03-27 | End: 2019-04-01

## 2019-03-27 RX ORDER — LABETALOL HYDROCHLORIDE 5 MG/ML
10 INJECTION, SOLUTION INTRAVENOUS EVERY 30 MIN PRN
Status: DISCONTINUED | OUTPATIENT
Start: 2019-03-27 | End: 2019-04-02 | Stop reason: HOSPADM

## 2019-03-27 RX ORDER — HYDRALAZINE HYDROCHLORIDE 20 MG/ML
10 INJECTION INTRAMUSCULAR; INTRAVENOUS EVERY 30 MIN PRN
Status: DISCONTINUED | OUTPATIENT
Start: 2019-03-27 | End: 2019-04-02 | Stop reason: HOSPADM

## 2019-03-27 RX ORDER — PROMETHAZINE HYDROCHLORIDE 25 MG/ML
12.5 INJECTION, SOLUTION INTRAMUSCULAR; INTRAVENOUS ONCE
Status: COMPLETED | OUTPATIENT
Start: 2019-03-27 | End: 2019-03-27

## 2019-03-27 RX ORDER — LEVETIRACETAM 5 MG/ML
500 INJECTION INTRAVASCULAR 2 TIMES DAILY
Status: DISCONTINUED | OUTPATIENT
Start: 2019-03-27 | End: 2019-03-30

## 2019-03-27 RX ADMIN — POTASSIUM BICARBONATE 40 MEQ: 782 TABLET, EFFERVESCENT ORAL at 10:25

## 2019-03-27 RX ADMIN — LABETALOL HYDROCHLORIDE 10 MG: 5 INJECTION INTRAVENOUS at 18:46

## 2019-03-27 RX ADMIN — OXYCODONE HYDROCHLORIDE 5 MG: 5 TABLET ORAL at 03:51

## 2019-03-27 RX ADMIN — LABETALOL HYDROCHLORIDE 10 MG: 5 INJECTION INTRAVENOUS at 21:08

## 2019-03-27 RX ADMIN — Medication 10 ML: at 21:39

## 2019-03-27 RX ADMIN — LABETALOL HYDROCHLORIDE 10 MG: 5 INJECTION INTRAVENOUS at 16:03

## 2019-03-27 RX ADMIN — LEVETIRACETAM 500 MG: 500 TABLET ORAL at 09:36

## 2019-03-27 RX ADMIN — ONDANSETRON HYDROCHLORIDE 4 MG: 2 SOLUTION INTRAMUSCULAR; INTRAVENOUS at 20:23

## 2019-03-27 RX ADMIN — MORPHINE SULFATE 2 MG: 2 INJECTION, SOLUTION INTRAMUSCULAR; INTRAVENOUS at 16:14

## 2019-03-27 RX ADMIN — TRIMETHOBENZAMIDE HYDROCHLORIDE 200 MG: 100 INJECTION INTRAMUSCULAR at 12:27

## 2019-03-27 RX ADMIN — LABETALOL HYDROCHLORIDE 10 MG: 5 INJECTION INTRAVENOUS at 14:54

## 2019-03-27 RX ADMIN — LEVETIRACETAM 500 MG: 5 INJECTION INTRAVENOUS at 21:38

## 2019-03-27 RX ADMIN — OXYCODONE HYDROCHLORIDE 5 MG: 5 TABLET ORAL at 09:37

## 2019-03-27 RX ADMIN — THIAMINE HYDROCHLORIDE 100 MG: 100 INJECTION, SOLUTION INTRAMUSCULAR; INTRAVENOUS at 08:08

## 2019-03-27 RX ADMIN — PROMETHAZINE HYDROCHLORIDE 12.5 MG: 25 INJECTION INTRAMUSCULAR; INTRAVENOUS at 22:22

## 2019-03-27 RX ADMIN — ONDANSETRON HYDROCHLORIDE 4 MG: 2 SOLUTION INTRAMUSCULAR; INTRAVENOUS at 08:01

## 2019-03-27 RX ADMIN — ONDANSETRON HYDROCHLORIDE 4 MG: 2 SOLUTION INTRAMUSCULAR; INTRAVENOUS at 14:32

## 2019-03-27 RX ADMIN — ACETAMINOPHEN 650 MG: 325 TABLET, FILM COATED ORAL at 03:51

## 2019-03-27 RX ADMIN — IBUPROFEN 800 MG: 400 TABLET, FILM COATED ORAL at 10:33

## 2019-03-27 RX ADMIN — HYDRALAZINE HYDROCHLORIDE 10 MG: 20 INJECTION INTRAMUSCULAR; INTRAVENOUS at 15:38

## 2019-03-27 RX ADMIN — HYDRALAZINE HYDROCHLORIDE 10 MG: 20 INJECTION INTRAMUSCULAR; INTRAVENOUS at 17:55

## 2019-03-27 RX ADMIN — TRIMETHOBENZAMIDE HYDROCHLORIDE 200 MG: 100 INJECTION INTRAMUSCULAR at 19:03

## 2019-03-27 RX ADMIN — FOLIC ACID 1 MG: 5 INJECTION, SOLUTION INTRAMUSCULAR; INTRAVENOUS; SUBCUTANEOUS at 08:02

## 2019-03-27 RX ADMIN — MORPHINE SULFATE 2 MG: 2 INJECTION, SOLUTION INTRAMUSCULAR; INTRAVENOUS at 20:23

## 2019-03-27 RX ADMIN — LABETALOL HYDROCHLORIDE 10 MG: 5 INJECTION INTRAVENOUS at 23:00

## 2019-03-27 RX ADMIN — OXYCODONE HYDROCHLORIDE 5 MG: 5 TABLET ORAL at 17:45

## 2019-03-27 RX ADMIN — MORPHINE SULFATE 2 MG: 2 INJECTION, SOLUTION INTRAMUSCULAR; INTRAVENOUS at 12:34

## 2019-03-27 ASSESSMENT — PAIN SCALES - GENERAL
PAINLEVEL_OUTOF10: 10
PAINLEVEL_OUTOF10: 6
PAINLEVEL_OUTOF10: 0
PAINLEVEL_OUTOF10: 10
PAINLEVEL_OUTOF10: 10
PAINLEVEL_OUTOF10: 6

## 2019-03-27 ASSESSMENT — PAIN DESCRIPTION - LOCATION
LOCATION: HEAD

## 2019-03-27 ASSESSMENT — PAIN DESCRIPTION - PAIN TYPE
TYPE: ACUTE PAIN

## 2019-03-27 ASSESSMENT — PAIN DESCRIPTION - ORIENTATION
ORIENTATION: POSTERIOR;ANTERIOR
ORIENTATION: POSTERIOR;ANTERIOR

## 2019-03-27 ASSESSMENT — PAIN DESCRIPTION - PROGRESSION
CLINICAL_PROGRESSION: NOT CHANGED
CLINICAL_PROGRESSION: NOT CHANGED

## 2019-03-27 ASSESSMENT — PAIN DESCRIPTION - DESCRIPTORS: DESCRIPTORS: HEADACHE

## 2019-03-27 NOTE — CARE COORDINATION
Met with pt briefly as he has a headache is nauseated and dizzy. He is a  who resides in [place]. Pt states he was standing next to his truck, fixing it when he got lightheaded and fell to the ground. Transported to ED by EMS. He is filing worker's comp for this admission. Pt states he does not have any other health insurance. Will assist pt with completing Froi. He states that his fianceFadia Barber is trying to make arrangements to travel here either by  air or car. Will await therapy evaluations to determine discharge needs.

## 2019-03-27 NOTE — PROGRESS NOTES
Physical Therapy  Initial Assessment     Name: Adela Mckeon  : 1955  MRN: [id number]    Date of Service: 3/27/2019    Evaluating PT:  Donny Murdock, PT, DPT, [id number]    Room #:  5665/5798-G  Diagnosis:  SDH  Reason for admission: syncopal fall, skull fx, SDH midline shift  Precautions:  Falls, SBP goal <140, occipital bone fx   Procedures: none   Equipment recommendations:  TBD     Pt lives with fiance in a 1 story home with 2 stair(s) to enter and 1 rail(s). Bed is on 1st floor and bath is on 1st floor. Pt ambulated with no AD PTA. Pt independent for ADL performance. Initial Evaluation  Date: 3/27/19 Treatment Short Term/ Long Term   Goals   AM-PAC 6 Clicks 97/55  -eval cut short by pt nausea and vomiting. Will need reassessed     Was pt agreeable to Eval/treatment? Yes      Does pt have pain?  \"11/10 headache\"     Bed Mobility  Rolling: supervision  Supine to sit: NT  Sit to supine: NT  Scooting: NT  independent   Transfers Sit to stand: NT  Stand to sit: NT  Stand pivot: NT  -unable to assess d/t vomiting  independent   Ambulation    NT    >200 feet with no AD independent   Stair negotiation: ascended and descended  NT  >2 steps with 1 rail mod I   ROM BUE:  See OT eval  BLE:  WFL     Strength BUE:  See OT eval  BLE grossly:  Unable to formally assess  5/5   Balance Sitting EOB:  NT  Dynamic Standing:  NT  Sitting EOB:  Independent  Dynamic Standing: Independent       -Pt is A & O x 3  -RASS:  0  -CAM-ICU:  NT  -Sensation:  Pt denies numbness and tingling to extremities  -Edema:  Unremarkable     Vitals:  Heart rate at rest 73 Heart rate post session -   SpO2 at rest 98% SpO2 post session -%   Blood Pressure at rest 165/95  Blood pressure post session -     Functional Status Score-Intensive Care Unit (FSS-ICU)   Rolling 5/7   Supine to sit transfer 0/7   Unsupported sitting  0/7   Sit to stand transfers 0/7   Ambulation 0/7   Total       Patient education  Pt educated on safety, sequencing during transfers, and role of PT     Patient response to education:   Pt verbalized understanding Pt demonstrated skill Pt requires further education in this area   Yes  No  Yes      Assessment/Comments  ---Pt supine in bed upon entry to room. RN cleared pt for eval. Agreeable to PT evaluation with OT collaboration. Evaluation cut short by pt nausea and vomiting. Pt strength assessed in supine - roughly displays at least 4/5 strength but will continue to assess. Displayed little to no difficulty rolling onto side. Pt with one episode of vomiting and c/o severe dizziness with any head movement. Left room and returned an hour later to reattempt further but pt continued to c/o of the above. Will need to reassess once pt nausea and vomiting subsides. Pt with all needs met and call light within reach. Pt will benefit from further skilled PT to address functional deficits described above. Pts/ family goals   1. None stated     Patient and or family understand(s) diagnosis, prognosis, and plan of care. Yes     PLAN  --PT care will be provided in accordance with the objectives noted above. Whenever appropriate, clear delegation orders will be provided for nursing staff. Exercises and functional mobility practice will be used as well as appropriate assistive devices or modalities to obtain goals. Patient and family education will also be administered as needed. --Frequency of treatments: 2-5x/week x 7-10 days.     Time in  0845  Time out  0905  Time in 1008  Time out [address], PT, [place]  [id number]

## 2019-03-27 NOTE — DISCHARGE SUMMARY
Physician Discharge Summary     Patient ID:  Talon Villagran  [id number]  38 y.o.  1955    Admit date: 3/26/2019    Discharge date and time: No discharge date for patient encounter. Admitting Physician: Jc Glover MD     Admission Diagnoses: SDH (subdural hematoma) ([facility] [L45.8L5R]  SDH (subdural hematoma) ([facility] [H98.0X4M]    Discharge Diagnoses: Active Problems:    Subdural hematoma (HCC)    Fracture of occipital bone of skull with loss of consciousness (HCC)    Hypokalemia    Electrolyte imbalance  Resolved Problems:    * No resolved hospital problems. *      Admission Condition: stable    Discharged Condition: stable    Indication for Admission: Intracranial hemorrhage    Hospital Course/Procedures/Operation/treatments:   3/26/19: Patient arrived to the ED after syncopal episode, found to have SDH with midline shift. Neurosurgery consulted, will manage conservatively. Repeat CT stable  3/27/19: continued nausea and emesis overnight, cannot tolerated PO intake  3/28/19: Complains of headache with nausea and vomiting  3/29/19: Transferred out of SICU. Complaining of headache   3/30: continues to have headaches and nausea. Given imitrex x 1. PT/OT  3/31: Still complaining of headache, nausea improved, BP better controlled though still hypertensive. 4/1: Having episodic dizziness. Headache and nausea improved. 4/2: Feeling better. Looking forward to leaving the hospital today.       Consults:   IP CONSULT TO NEUROSURGERY  IP CONSULT TO TRAUMA SURGERY  IP CONSULT TO SOCIAL WORK  IP CONSULT TO IV TEAM  IP CONSULT TO SOCIAL WORK  IP CONSULT TO HOME CARE NEEDS  IP CONSULT TO HOME CARE NEEDS    Significant Diagnostic Studies:   Xr Pelvis (1-2 Views)    Result Date: 3/26/2019  Patient MRN:  [id number] : 1955 Age: 66 years Gender: Male Order Date:  3/26/2019 5:15 PM EXAM: XR PELVIS (1-2 VIEWS) INDICATION:  trauma trauma COMPARISON: None NUMBER OF IMAGES:  1 FINDINGS:  AP supine portable view of the pelvis was obtained. There is no evidence of acute fracture or hip dislocation. The sacroiliac joints and hip joints are symmetrical and within normal limits. No osteoblastic or osteolytic process is seen. There is advanced degenerative disc disease in the lower lumbar spine with associated arthritic changes. CONCLUSION:  No evidence of acute fracture. Xr Elbow Left (2 Views)    Result Date: 3/26/2019  Patient MRN:  [id number] : 1955 Age: 66 years Gender: Male Order Date:  3/26/2019 3:45 PM EXAM: XR ELBOW LEFT (2 VIEWS) NUMBER OF IMAGES:  2 INDICATION:  Left elbow pain following fall with syncopal episode presenting acutely Pain COMPARISON: None FINDINGS: No definite acute fracture or dislocation. Bones normally mineralized. Joint spaces relatively preserved. Please note the lateral view is somewhat rotated such that detection of a joint effusion is limited. No definite acute fracture or dislocation. Please note, the lateral view is rotated and can be repeated at no additional charge. See above. Ct Head Wo Contrast    Result Date: 3/26/2019  Comparison: 1558 hours 2019. Axial, sagittal, and coronal computed tomography of the brain and calvarium was performed at 2230 hours 2019. As seen on the prior study earlier today, a subdural fluid collection is identified underlying the inner table of the right frontal and temporal bones. Measuring approximately 8 mm in thickness, once again results in mediastinal shift to the left by approximately 6 mm. There is effacement of the frontal, parietal and temporal sulci. Within the posterior midline occipital bone, a nondisplaced fracture is present. Subdural hemorrhage also extends to the anterior interhemispheric fissure. Otherwise the brain parenchyma, CSF spaces, paranasal sinuses and mastoid air cells and surrounding soft tissue and osseous structures have a satisfactory appearance.      As seen on the prior study earlier today, a subdural fluid collection is identified underlying the inner table of the right frontal and temporal bones. Measuring approximately 8 mm in thickness, once again results in mediastinal shift to the left by approximately 6 mm. There is effacement of the frontal, parietal and temporal sulci. Within the posterior midline occipital bone, a nondisplaced fracture is present. Subdural hemorrhage also extends to the anterior interhemispheric fissure. Ct Head Wo Contrast    Result Date: 3/26/2019  Patient MRN:  [id number] : 1955 Age: 66 years Gender: Male Order Date:  3/26/2019 3:45 PM EXAM: CT HEAD WO CONTRAST COMPARISON: None INDICATION:  syncope  TECHNIQUE: Axial unenhanced CT scanning was performed through the head without the use of intravenous contrast. Low-dose CT acquisition technique included one of the following options; 1. Automated exposure control, 2. Adjustment of mA and/or kV according to the patient's size or 3. Use of iterative reconstruction. FINDINGS: There is a right frontal subdural hematoma which measures approximately 8 mm in depth with local sulcal effacement. Hematoma extends caudally and is seen overlying right temporal lobe measuring 5 mm in depth also with local sulcal effacement. Minimal subdural hemorrhage is also seen at midline within the anterior interhemispheric fissure. There is leftward midline shift of approximately 7 mm. There is no evidence of acute intracranial edema. Basilar cisterns are patent. There is a hairline, nondepressed fracture at midline involving the occipital bone extending into foramen magnum. Mastoid air cells are clear. Visualized maxillary sinuses are clear. 1. Right frontal and temporal subdural hematoma with leftward midline shift of 7 mm. 2. Nondepressed occipital bone fracture. Findings called to Dr. Екатерина Sultana at [facility] 3/26/2019. ALERT:  CRITICAL RESULT.     Ct Cervical Spine Wo Contrast    Result Date: 3/26/2019  This examination and all examinations utilizing ionizing radiation at this facility are done so according to the ALARA (as low as reasonably achievable) principal for radiation dose reduction. Axial, sagittal, and coronal computed tomography of cervical spine performed without contrast. There is a nondisplaced fracture through the posterior midline occipital bone. There is no other evidence of fracture or spondylolisthesis. There is straightening of lordosis. There is diffuse degenerative spondylosis and facet arthropathy. There is no evidence of central or foraminal stenosis. There is no precervical soft tissue swelling. The remainder of the regional soft tissue and osseous structures are unremarkable. Nondisplaced fracture of the posterior midline occipital bone. Degenerative disc and facet disease without evidence of central or foraminal stenosis. Straightening of lordosis which can be due to muscle spasm or positional.    Xr Chest Portable    Result Date: 3/26/2019  Patient MRN:  [id number] : 1955 Age: 66 years Gender: Male Order Date:  3/26/2019 3:45 PM EXAM: XR CHEST PORTABLE INDICATION:  syncope syncope COMPARISON: None NUMBER OF IMAGES:  1 FINDINGS:  AP semierect portable view of the chest was obtained. The lung fields are clear. No pleural effusion or pneumothorax is seen. Heart size is at the upper limits of normal and there is mild tortuosity of the thoracic aorta. There are median sternotomy sutures and mediastinal vascular clips. No mediastinal or hilar enlargement is seen. The visible bony structures appear intact. CONCLUSION:  1. No evidence of an acute cardiopulmonary process. 2. Top normal heart size and mildly tortuous thoracic aorta. 3. Evidence of previous median sternotomy.        Discharge Exam:  Awake and alert  Follows commands  Hrt:  Regular  Lungs:  Fairly clear bilaterally  Abd:  Soft; BS+; NT/ND  Skin:  Warm/dry  Ext:  No sensorimotor deficits    Disposition: home    In process/preliminary results:  Outstanding Order Results INSTRUCTIONS:  [x]May shower      [x]No sitting in bath tub, hot tub or swimming. [x]Ice to areas of pain for first 24 hours. Heat to areas of pain after that. MEDICATION INSTRUCTIONS:  [x]Take medication as prescribed. [x]When taking pain medications, you may experience dizziness or drowsiness. Do not drink alcohol or drive when taking these medications. [x]You may take Ibuprofen (over the counter) as per directions for mild pain. [x]Do not take any other acetaminophen (Tylenol) products while taking your pain medication. [x]You may experience constipation while taking pain medication - You may take over the counter stool softeners: docuscate (Colace) or sennosides S (Senokot - S). WORK:  [x]You may not return to work until [employer] by Neurosurgery    Long Acting Opioid Analgesics Patient Education    The [employer] & Don'ts  Of  Opioid Analgesics    DO:  · Read the Medication Guide  · Take your medication exactly as prescribed. · Store your medicine away from children and in a safe place. · Flush unused medicine down the toilet. · Call your healthcare provider for medical advice about side effects. You may report side effects to the FDA at 4-083-FDA-9741    Call 911 or your local emergency service right away if:   · You take too much medicine  · You have trouble breathing or shortness of breath  · A child has taken this medicine    Talk to you health care provider:  · If the dose you are taking does not control your pain  · About any side effects that you may be having  · About all the medicines you take, including over the counter medicines, vitamins, and dietary supplements. DON'T   · Don't give your medicine to others  · Do not take medicine unless it prescribed for you  · Do not stop taking your medicine without talking to your healthcare provider  · Do not break, chew, crush, dissolve, or inject your medicine.   If you cannot swallow your medicine whole, talk to your healthcare provider. · Do not drink alcohol while taking this medicine    For additional information on your medicine go to: dailymed. nlm.nih.gov      Call physician for any of the following or for questions/concerns:   · Fever over 101° F    · Redness, swelling, hardness or warmth at the wound site (s)  · Unrelieved nausea/vomiting    · Foul smelling or cloudy drainage at the wound site (s)   · Unrelieved pain or increase in pain     · Increase in shortness of breath       Follow up:   No follow-up provider specified.      Signed:  Virginia Naqvi MD  4/2/2019  5:47 AM

## 2019-03-27 NOTE — PROGRESS NOTES
Department of Neurosurgery  Progress Note    CHIEF COMPLAINT: SDH, dizziness    SUBJECTIVE:  Pt c/o headache and nausea this AM. No new issues overnight. REVIEW OF SYSTEMS :  Constitutional: Negative for chills and fever. Neurological: Negative for dizziness, tremors and speech change. OBJECTIVE:   VITALS:  BP (!) 165/91   Pulse 72   Temp 98.7 °F (37.1 °C) (Oral)   Resp 11   Ht 5' 7\" (1.702 m)   Wt 230 lb (104.3 kg)   SpO2 95%   BMI 36.02 kg/m²     PHYSICAL:  Constitutional: He appears well-developed and well-nourished. HENT:   Head: Normocephalic. Eyes: Pupils are equal, round, and reactive to light. Conjunctivae and EOM are normal.   Neck: No tracheal deviation present. Cardiovascular: Normal rate. Pulmonary/Chest: Effort normal.   Abdominal: He exhibits no distension. Musculoskeletal: Normal range of motion. Left elbow tenderness to palpation    Neurological:    Alert and oriented x3   Face symmetric   No pronator drift   Motor strength full   Sensation intact to light touch    Skin: Skin is warm and dry.    Psychiatric: Thought content normal.            DATA:  CBC:   Lab Results   Component Value Date    WBC 5.0 03/27/2019    RBC 4.00 03/27/2019    HGB 12.6 03/27/2019    HCT 38.2 03/27/2019    MCV 95.5 03/27/2019    MCH 31.5 03/27/2019    MCHC 33.0 03/27/2019    RDW 13.2 03/27/2019     03/27/2019    MPV 10.5 03/27/2019     BMP:    Lab Results   Component Value Date     03/27/2019    K 3.4 03/27/2019     03/27/2019    CO2 24 03/27/2019    BUN 10 03/27/2019    LABALBU 3.8 03/27/2019    CREATININE 0.8 03/27/2019    CALCIUM 8.3 03/27/2019    GFRAA >60 03/27/2019    LABGLOM >60 03/27/2019    GLUCOSE 112 03/27/2019     PT/INR:    Lab Results   Component Value Date    PROTIME 11.4 03/26/2019    INR 1.0 03/26/2019     PTT:    Lab Results   Component Value Date    APTT 25.6 03/26/2019   [APTT}    Current Inpatient Medications  Current Facility-Administered Medications: potassium bicarb-citric acid (EFFER-K) effervescent tablet 40 mEq, 40 mEq, Oral, Daily  trimethobenzamide (TIGAN) injection 200 mg, 200 mg, Intramuscular, Q6H PRN  sodium chloride flush 0.9 % injection 10 mL, 10 mL, Intravenous, 2 times per day  sodium chloride flush 0.9 % injection 10 mL, 10 mL, Intravenous, PRN  magnesium hydroxide (MILK OF MAGNESIA) 400 MG/5ML suspension 30 mL, 30 mL, Oral, Daily PRN  ondansetron (ZOFRAN) injection 4 mg, 4 mg, Intravenous, Q6H PRN  levETIRAcetam (KEPPRA) tablet 500 mg, 500 mg, Oral, BID  nicotine (NICODERM CQ) 21 MG/24HR 1 patch, 1 patch, Transdermal, Daily  thiamine (B-1) injection 100 mg, 100 mg, Intravenous, Daily  folic acid injection 1 mg, 1 mg, Intravenous, Daily  acetaminophen (TYLENOL) tablet 650 mg, 650 mg, Oral, Q4H  oxyCODONE (ROXICODONE) immediate release tablet 5 mg, 5 mg, Oral, Q4H PRN  perflutren lipid microspheres (DEFINITY) injection 1.65 mg, 1.5 mL, Intravenous, ONCE PRN    ASSESSMENT:   New problem: right frontal-temporal SDH s/p fall     Fatou Kruger is 66years old. He presented with a syncopal episode. He was found passed out in his truck. He hit the back of his head. He suffered an occipital bone fracture. He has a right frontotemporal subdural hematoma measuring 8 mm in thickness. There is 7 mm of midline shift. The basal cisterns are patent. He is currently neurologically intact. No current surgical intervention is planned. 3/26 HCT: right frontal and temporal SDH measuring 8mm thick with local sulcal effacement and 7mm midline shift. The basal cisterns are patent. Non-depressed occipital bone fx.     3/26 CT C spine- no fx    He is currently asymptomatic from the subdural hemorrhage. 3/26 Repeat HCT: stable. PLAN:  -No surgical intervention needed if neurological exam remains without any deficits.    -SBP goal < 140   -Avoid hyponatremia  -Serial neurological exams  -Follow up in neurosurgery clinic in 1 month with repeat head CT   -keppra 500mg bid for week. Electronically signed by Michael Connors PA-C on 3/27/2019 at 11:29 AM       I independently saw, evaluated, and examined the patient. Agree with plan outlined above.    Electronically signed by Emily Campbell MD on 3/27/2019 at 7:54 PM

## 2019-03-27 NOTE — PROGRESS NOTES
Occupational Therapy  OCCUPATIONAL THERAPY INITIAL EVALUATION      Date:3/27/2019  Patient Name: Lazarus Kansky  MRN: [id number]  : 1955  Room: [address] [place], [place] [address]    Evaluating OT: HAMLET Sharma/L    AM-PAC Daily Activity Raw Score: 15  Recommended Adaptive Equipment: continue to assess     Comments: Based on patient's functional performance as stated above and level of assistance needed prior to admission, this therapist believes that the patient would benefit from continued skilled OT during/following hospital stay in an effort to increase safety, functional independence, and quality of life. Diagnosis: SDH (R frontal/temporal with midline shift)  Reason for admission: Pt with syncopal episode and fall at work. Pt found to have occipital bone fracture and SDH  Pertinent Medical History: None  Precautions:  Falls, SBP goal <140, occipital bone fracture     Home Living: Pt lives with fiance in a 1 story home with 2 step(s) to enter and 1 rail(s); bed/bath on 1st floor. Laundry located in basement however pt does not do. Bathroom setup: Pt using tub/ shower; standard toilet  Equipment owned: none    Prior Level of Function: Indep with ADLs; Indep with IADLs; using no device  for ambulation. Driving: yes  Occupation:     Pain Level: \"11/10\" headache; continual nausea  Cognition: A&O: 4/4; Follows basic step directions. Communication: WNL   Memory: F    Sequencing: F   Problem solving: F   Judgement/safety: F     RASS: 0  CAM-ICU: NT     Functional Assessment:   Initial Eval Status  Date: 3/27/19 Treatment Status  Date: Short Term Goals  Treatment frequency: 1-3x/week on ICU; PRN on stepdown unit  -pt will improve. .. Feeding NT  Pt nauseated and vomiting during session. Poor appetite.       Indep  Sitting upright in chair for majority of meals   Grooming Supervision  Supine bed level to wash face after vomiting     Indep   while seated/standing ; G BUE functional use; G initiation, sequencing and follow-through   UB Dressing NT     Indep  while seated; including clothing retrieval; G BUE functional use; G  initiation, sequencing and follow-through   LB Dressing NT    Indep  Including clothing retrieval   Bathing UB-  NT  LB-  NT    UB-  Indep  LB-  Mod I with DME prn   Toileting Setup  Urinal use only     Mod I  including clothing mgmt and toileting hygiene using DME prn   Bed Mobility  Supine to sit: NT  Sit to supine: NT  Rolling: Supervision   Indep   in prep for EOB ADL tasks   Functional/  bathroom Transfers Sit to stand: NT  Stand to sit: NT  Stand pivot: NT   Indep  from varying surfaces     Functional Mobility NT   Indep   Household distance no LOB noted   Balance Sitting:     Static:  NT    Dynamic:NT  Standing:     Static:  NT    Dynamic:NT  demo Indep dynamic sitting balance EOB during ADL tasks; Mod I dynamic standing balance during ADL tasks using stable surface prn   Endurance/  Activity Tolerance P activity tolerance/endurance during light ADL task ; pt unable to change head position at bed level, attempt sitting EOB due to nausea/vomiting    demo G activity tolerance/endurance during [id number] min ADL task    Visual/  Perceptual Glasses: None present. Pt reports constant dizziness \"spinning\" with minimal change in head position when supine in bed. Pt able to tell correct time on wall. Safety F  G safety noted during ADL routine and functional mobility; minimal to no VC required for judgment, problem solving, safety awareness      Hand dominance: right      Strength ROM  Comments   RUE  Proximal: 4+/5  Distal: 5/5 Proximal:  -PROM: WFL   -AROM: WFL  Distal: WFL G   G FMC/dexterity noted during ADL tasks   LUE Proximal: 4+/5  Distal: 5/5 Proximal:  -PROM: WFL  -AROM: WFL  Distal: WFL G   G FMC/dexterity noted during ADL tasks      Hearing: WFL   Sensation:  Pt denies any numbness or tingling in BUE/BLE.   Tone: WNL  Edema: Unremarkable    Vitals:   BP at rest:

## 2019-03-27 NOTE — PROGRESS NOTES
Trauma Tertiary Survey    Admit Date: 3/26/2019  Hospital day 1    Syncope and collapse with SDH    CC:  Headache     History reviewed. No pertinent past medical history. Alcohol pre-screening:  Men: How many times in the past year have you had 5 or more drinks in a day?  1 or more      Scheduled Meds:   potassium bicarb-citric acid  40 mEq Oral Daily    sodium chloride flush  10 mL Intravenous 2 times per day    levETIRAcetam  500 mg Oral BID    nicotine  1 patch Transdermal Daily    thiamine  100 mg Intravenous Daily    folic acid  1 mg Intravenous Daily    acetaminophen  650 mg Oral Q4H    ibuprofen  800 mg Oral TID WC     Continuous Infusions:   sodium chloride 75 mL/hr at 03/26/19 1930     PRN Meds:sodium chloride flush, magnesium hydroxide, ondansetron, oxyCODONE, perflutren lipid microspheres    Subjective:     Patient has complaints headache. .  Pain is moderate, worsens with movement, and some relief by rest.  There is not associated numbness, tingling, weakness. Objective:     Patient Vitals for the past 8 hrs:   BP Temp Temp src Pulse Resp SpO2   03/27/19 0900 (!) 158/91 -- -- 77 25 98 %   03/27/19 0800 (!) 161/86 -- -- 67 10 98 %   03/27/19 0700 -- -- -- 66 12 97 %   03/27/19 0600 (!) 141/77 98.6 °F (37 °C) -- 68 12 96 %   03/27/19 0500 134/84 -- -- 65 12 93 %   03/27/19 0400 129/87 98.1 °F (36.7 °C) Temporal 63 21 96 %   03/27/19 0300 (!) 152/92 -- -- 64 12 98 %   03/27/19 0200 (!) 140/70 98.5 °F (36.9 °C) -- 65 15 98 %       I/O last 3 completed shifts: In: 8530 [P.O.:460; I.V.:925; IV Piggyback:100]  Out: 700 [Urine:700]  No intake/output data recorded. History reviewed. No pertinent past medical history. Radiology:  CT Head WO Contrast   Final Result      As seen on the prior study earlier today, a subdural fluid collection   is identified underlying the inner table of the right frontal and   temporal bones.  Measuring approximately 8 mm in thickness, once again   results in mediastinal shift to the left by approximately 6 mm. There   is effacement of the frontal, parietal and temporal sulci. Within the posterior midline occipital bone, a nondisplaced fracture   is present. Subdural hemorrhage also extends to the anterior interhemispheric   fissure. CT Cervical Spine WO Contrast   Final Result      Nondisplaced fracture of the posterior midline occipital bone. Degenerative disc and facet disease without evidence of central or   foraminal stenosis. Straightening of lordosis which can be due to muscle spasm or   positional.      XR PELVIS (1-2 VIEWS)   Final Result      XR ELBOW LEFT (2 VIEWS)   Final Result   No definite acute fracture or dislocation. Please note, the lateral   view is rotated and can be repeated at no additional charge. See   above. XR CHEST PORTABLE   Final Result      CT Head WO Contrast   Final Result      1. Right frontal and temporal subdural hematoma with leftward midline   shift of 7 mm. 2. Nondepressed occipital bone fracture. Findings called to Dr. Vonda Alaniz at [facility] PM 3/26/2019. ALERT:  CRITICAL RESULT.           PHYSICAL EXAM:   GCS:  4 - Opens eyes on own   6 - Follows simple motor commands  5 - Alert and oriented    Pupil size:  Left 4 mm Right 4 mm  Pupil reaction: Yes  Wiggles fingers: Left Yes Right Yes  Hand grasp:   Left normal   Right normal  Wiggles toes: Left Yes    Right Yes  Plantar flexion: Left normal  Right normal    BP (!) 158/91   Pulse 77   Temp 98.6 °F (37 °C)   Resp 25   Ht 5' 7\" (1.702 m)   Wt 230 lb (104.3 kg)   SpO2 98%   BMI 36.02 kg/m²   General appearance: alert, appears stated age, cooperative and no distress  HEENT: Posterior scalp hematoma, PERRL, EOMI  Neck: supple, symmetrical, trachea midline  Back: No tenderness, stepoffs or deformities  Lungs: clear to auscultation bilaterally  Chest wall: no tenderness, scar midline from prior surgery  Heart: regular rate and rhythm  Abdomen: normal findings: soft, non-tender and non-distended, BS present  Extremities: no edema  Neuro: Alert and oriented x person, place and year. Follows commands. No gross motor or sensory deficits  Skin: warm, dry, cap refill<3 sec         Spine:     Spine Tenderness ROM   Cervical 0 /10 Normal   Thoracic 0 /10 Normal   Lumbar 0 /10 Normal     Musculoskeletal    Joint Tenderness Swelling ROM   Right shoulder absent absent normal   Left shoulder absent absent normal   Right elbow absent absent normal   Left elbow present absent normal   Right wrist absent absent normal   Left wrist absent absent normal   Right hand grasp absent absent normal   Left hand grasp absent absent normal   Right hip absent absent normal   Left hip absent absent normal   Right knee absent absent normal   Left knee absent absent normal   Right ankle absent absent normal   Left ankle absent absent normal   Right foot absent absent normal   Left foot absent absent normal       CONSULTS: Neurosurgery. PROCEDURES: none    INJURIES:      Active Problems:    Subdural hematoma (HCC)    Fracture of occipital bone of skull with loss of consciousness (HCC)  Resolved Problems:    * No resolved hospital problems. *        Assessment/Plan:       Neuro:    Right frontal-temporal SDH s/p syncope and collapse  - Appreciate Neurosurgery recommendations   - Monitor neuro status. - Keppra x 7 days  - Repeat CT Head performed  Hx of Alcohol abuse: admits to two shots tequilla daily  - Thiamine, folate and multivitamine    CV:   Syncope and collapse with SDH  - Monitor hemodynamics with goal systolic<140  - ECHO today   - EKG noted    Pulm:   No acute issues   Monitor RR & SpO2.  50 pack year smoking history: nicotine patch        GI:   No acute issues. Diet: General   Monitor bowel function. Zofran prn      Renal:   No acute issues. Monitor BUN & Cr.   Monitor electrolytes & replace as needed. Monitor urine output.      ID:   No acute issues    Endocrine:   Hyperglycemia without  Hx of DM, continue to mojgan BS.     MSK:   Occipital bone fracture     Heme:   No acute issues. Bowel regime: MOM prn  Pain control/Sedation: Roxicodone, Tylenol. DVT prophylaxis: SCDs. No Lovenox/heparin due to SDH . GI: Diet. Glucose protocol:  n/a  Mouth/Eye care: As needed.     Code status:   Full Code  Patient/Family update:  As needed    Disposition:  SICU      Electronically signed by Kaley Bautista DO on 3/27/19 at 5:34 AM

## 2019-03-28 LAB
ALBUMIN SERPL-MCNC: 4 G/DL (ref 3.5–5.2)
ALP BLD-CCNC: 71 U/L (ref 40–129)
ALT SERPL-CCNC: 10 U/L (ref 0–40)
ANION GAP SERPL CALCULATED.3IONS-SCNC: 14 MMOL/L (ref 7–16)
AST SERPL-CCNC: 17 U/L (ref 0–39)
BASOPHILS ABSOLUTE: 0.01 E9/L (ref 0–0.2)
BASOPHILS RELATIVE PERCENT: 0.1 % (ref 0–2)
BILIRUB SERPL-MCNC: 0.4 MG/DL (ref 0–1.2)
BUN BLDV-MCNC: 10 MG/DL (ref 8–23)
CALCIUM IONIZED: 1.29 MMOL/L (ref 1.15–1.33)
CALCIUM SERPL-MCNC: 9 MG/DL (ref 8.6–10.2)
CHLORIDE BLD-SCNC: 102 MMOL/L (ref 98–107)
CO2: 24 MMOL/L (ref 22–29)
CREAT SERPL-MCNC: 0.8 MG/DL (ref 0.7–1.2)
EOSINOPHILS ABSOLUTE: 0.03 E9/L (ref 0.05–0.5)
EOSINOPHILS RELATIVE PERCENT: 0.4 % (ref 0–6)
GFR AFRICAN AMERICAN: >60
GFR NON-AFRICAN AMERICAN: >60 ML/MIN/1.73
GLUCOSE BLD-MCNC: 119 MG/DL (ref 74–99)
HCT VFR BLD CALC: 42.1 % (ref 37–54)
HEMOGLOBIN: 13.9 G/DL (ref 12.5–16.5)
IMMATURE GRANULOCYTES #: 0.02 E9/L
IMMATURE GRANULOCYTES %: 0.2 % (ref 0–5)
LYMPHOCYTES ABSOLUTE: 0.71 E9/L (ref 1.5–4)
LYMPHOCYTES RELATIVE PERCENT: 8.8 % (ref 20–42)
MAGNESIUM: 2.2 MG/DL (ref 1.6–2.6)
MCH RBC QN AUTO: 31.6 PG (ref 26–35)
MCHC RBC AUTO-ENTMCNC: 33 % (ref 32–34.5)
MCV RBC AUTO: 95.7 FL (ref 80–99.9)
MONOCYTES ABSOLUTE: 0.88 E9/L (ref 0.1–0.95)
MONOCYTES RELATIVE PERCENT: 11 % (ref 2–12)
MRSA CULTURE ONLY: NORMAL
NEUTROPHILS ABSOLUTE: 6.38 E9/L (ref 1.8–7.3)
NEUTROPHILS RELATIVE PERCENT: 79.5 % (ref 43–80)
PDW BLD-RTO: 13.7 FL (ref 11.5–15)
PHOSPHORUS: 2.7 MG/DL (ref 2.5–4.5)
PLATELET # BLD: 235 E9/L (ref 130–450)
PMV BLD AUTO: 10.4 FL (ref 7–12)
POTASSIUM REFLEX MAGNESIUM: 3.8 MMOL/L (ref 3.5–5)
RBC # BLD: 4.4 E12/L (ref 3.8–5.8)
SODIUM BLD-SCNC: 140 MMOL/L (ref 132–146)
TOTAL PROTEIN: 7.2 G/DL (ref 6.4–8.3)
WBC # BLD: 8 E9/L (ref 4.5–11.5)

## 2019-03-28 PROCEDURE — 2580000003 HC RX 258: Performed by: SURGERY

## 2019-03-28 PROCEDURE — 82330 ASSAY OF CALCIUM: CPT

## 2019-03-28 PROCEDURE — 2060000000 HC ICU INTERMEDIATE R&B

## 2019-03-28 PROCEDURE — 36569 INSJ PICC 5 YR+ W/O IMAGING: CPT

## 2019-03-28 PROCEDURE — 2500000003 HC RX 250 WO HCPCS: Performed by: STUDENT IN AN ORGANIZED HEALTH CARE EDUCATION/TRAINING PROGRAM

## 2019-03-28 PROCEDURE — 6360000002 HC RX W HCPCS: Performed by: STUDENT IN AN ORGANIZED HEALTH CARE EDUCATION/TRAINING PROGRAM

## 2019-03-28 PROCEDURE — 97530 THERAPEUTIC ACTIVITIES: CPT

## 2019-03-28 PROCEDURE — 76937 US GUIDE VASCULAR ACCESS: CPT

## 2019-03-28 PROCEDURE — 97164 PT RE-EVAL EST PLAN CARE: CPT

## 2019-03-28 PROCEDURE — 2500000003 HC RX 250 WO HCPCS: Performed by: EMERGENCY MEDICINE

## 2019-03-28 PROCEDURE — 6360000002 HC RX W HCPCS: Performed by: EMERGENCY MEDICINE

## 2019-03-28 PROCEDURE — C1751 CATH, INF, PER/CENT/MIDLINE: HCPCS

## 2019-03-28 PROCEDURE — 99232 SBSQ HOSP IP/OBS MODERATE 35: CPT | Performed by: NEUROLOGICAL SURGERY

## 2019-03-28 PROCEDURE — 85025 COMPLETE CBC W/AUTO DIFF WBC: CPT

## 2019-03-28 PROCEDURE — 80053 COMPREHEN METABOLIC PANEL: CPT

## 2019-03-28 PROCEDURE — 97168 OT RE-EVAL EST PLAN CARE: CPT

## 2019-03-28 PROCEDURE — 6370000000 HC RX 637 (ALT 250 FOR IP): Performed by: STUDENT IN AN ORGANIZED HEALTH CARE EDUCATION/TRAINING PROGRAM

## 2019-03-28 PROCEDURE — 99291 CRITICAL CARE FIRST HOUR: CPT | Performed by: SURGERY

## 2019-03-28 PROCEDURE — 6360000002 HC RX W HCPCS: Performed by: SURGERY

## 2019-03-28 PROCEDURE — 36415 COLL VENOUS BLD VENIPUNCTURE: CPT

## 2019-03-28 PROCEDURE — 83735 ASSAY OF MAGNESIUM: CPT

## 2019-03-28 PROCEDURE — 84100 ASSAY OF PHOSPHORUS: CPT

## 2019-03-28 PROCEDURE — 05HY33Z INSERTION OF INFUSION DEVICE INTO UPPER VEIN, PERCUTANEOUS APPROACH: ICD-10-PCS | Performed by: NEUROLOGICAL SURGERY

## 2019-03-28 RX ORDER — DEXTROSE, SODIUM CHLORIDE, AND POTASSIUM CHLORIDE 5; .45; .3 G/100ML; G/100ML; G/100ML
INJECTION INTRAVENOUS CONTINUOUS
Status: DISCONTINUED | OUTPATIENT
Start: 2019-03-28 | End: 2019-03-29

## 2019-03-28 RX ORDER — SCOLOPAMINE TRANSDERMAL SYSTEM 1 MG/1
1 PATCH, EXTENDED RELEASE TRANSDERMAL
Status: DISCONTINUED | OUTPATIENT
Start: 2019-03-28 | End: 2019-04-02 | Stop reason: HOSPADM

## 2019-03-28 RX ADMIN — FOLIC ACID 1 MG: 5 INJECTION, SOLUTION INTRAMUSCULAR; INTRAVENOUS; SUBCUTANEOUS at 08:48

## 2019-03-28 RX ADMIN — ONDANSETRON HYDROCHLORIDE 4 MG: 2 SOLUTION INTRAMUSCULAR; INTRAVENOUS at 15:53

## 2019-03-28 RX ADMIN — LABETALOL HYDROCHLORIDE 10 MG: 5 INJECTION INTRAVENOUS at 22:04

## 2019-03-28 RX ADMIN — LEVETIRACETAM 500 MG: 5 INJECTION INTRAVENOUS at 20:54

## 2019-03-28 RX ADMIN — MORPHINE SULFATE 2 MG: 2 INJECTION, SOLUTION INTRAMUSCULAR; INTRAVENOUS at 04:39

## 2019-03-28 RX ADMIN — LABETALOL HYDROCHLORIDE 10 MG: 5 INJECTION INTRAVENOUS at 17:34

## 2019-03-28 RX ADMIN — THIAMINE HYDROCHLORIDE 100 MG: 100 INJECTION, SOLUTION INTRAMUSCULAR; INTRAVENOUS at 09:25

## 2019-03-28 RX ADMIN — DEXTROSE MONOHYDRATE, SODIUM CHLORIDE, AND POTASSIUM CHLORIDE: 50; 4.5; 2.98 INJECTION, SOLUTION INTRAVENOUS at 17:34

## 2019-03-28 RX ADMIN — ONDANSETRON HYDROCHLORIDE 4 MG: 2 SOLUTION INTRAMUSCULAR; INTRAVENOUS at 04:40

## 2019-03-28 RX ADMIN — LEVETIRACETAM 500 MG: 5 INJECTION INTRAVENOUS at 09:29

## 2019-03-28 RX ADMIN — HYDRALAZINE HYDROCHLORIDE 10 MG: 20 INJECTION INTRAMUSCULAR; INTRAVENOUS at 01:04

## 2019-03-28 RX ADMIN — TRIMETHOBENZAMIDE HYDROCHLORIDE 200 MG: 100 INJECTION INTRAMUSCULAR at 13:11

## 2019-03-28 RX ADMIN — MORPHINE SULFATE 2 MG: 2 INJECTION, SOLUTION INTRAMUSCULAR; INTRAVENOUS at 00:51

## 2019-03-28 RX ADMIN — HYDRALAZINE HYDROCHLORIDE 10 MG: 20 INJECTION INTRAMUSCULAR; INTRAVENOUS at 19:23

## 2019-03-28 RX ADMIN — LABETALOL HYDROCHLORIDE 10 MG: 5 INJECTION INTRAVENOUS at 12:23

## 2019-03-28 RX ADMIN — Medication 10 ML: at 20:57

## 2019-03-28 RX ADMIN — MORPHINE SULFATE 2 MG: 2 INJECTION, SOLUTION INTRAMUSCULAR; INTRAVENOUS at 20:55

## 2019-03-28 RX ADMIN — MORPHINE SULFATE 2 MG: 2 INJECTION, SOLUTION INTRAMUSCULAR; INTRAVENOUS at 13:10

## 2019-03-28 RX ADMIN — HYDRALAZINE HYDROCHLORIDE 10 MG: 20 INJECTION INTRAMUSCULAR; INTRAVENOUS at 03:18

## 2019-03-28 RX ADMIN — MORPHINE SULFATE 2 MG: 2 INJECTION, SOLUTION INTRAMUSCULAR; INTRAVENOUS at 16:48

## 2019-03-28 RX ADMIN — TRIMETHOBENZAMIDE HYDROCHLORIDE 200 MG: 100 INJECTION INTRAMUSCULAR at 20:56

## 2019-03-28 RX ADMIN — MORPHINE SULFATE 2 MG: 2 INJECTION, SOLUTION INTRAMUSCULAR; INTRAVENOUS at 09:23

## 2019-03-28 RX ADMIN — LABETALOL HYDROCHLORIDE 10 MG: 5 INJECTION INTRAVENOUS at 14:05

## 2019-03-28 RX ADMIN — Medication 10 ML: at 09:25

## 2019-03-28 ASSESSMENT — PAIN DESCRIPTION - PAIN TYPE
TYPE: ACUTE PAIN

## 2019-03-28 ASSESSMENT — PAIN SCALES - GENERAL
PAINLEVEL_OUTOF10: 7
PAINLEVEL_OUTOF10: 10
PAINLEVEL_OUTOF10: 7

## 2019-03-28 ASSESSMENT — PAIN DESCRIPTION - PROGRESSION: CLINICAL_PROGRESSION: NOT CHANGED

## 2019-03-28 ASSESSMENT — PAIN DESCRIPTION - ORIENTATION: ORIENTATION: POSTERIOR;ANTERIOR

## 2019-03-28 ASSESSMENT — PAIN DESCRIPTION - LOCATION
LOCATION: HEAD

## 2019-03-28 ASSESSMENT — PAIN DESCRIPTION - DESCRIPTORS
DESCRIPTORS: HEADACHE
DESCRIPTORS: HEADACHE

## 2019-03-28 NOTE — PROGRESS NOTES
Unable to draw morning blood work on nite shift and  x2 RN attempts on day shift, unable to draw from peripheral site also. Resident notified earlier.

## 2019-03-28 NOTE — PROGRESS NOTES
Department of Neurosurgery  Progress Note    CHIEF COMPLAINT: SDH, dizziness    SUBJECTIVE:  Nausea and HA better this AM. No new issues overnight. REVIEW OF SYSTEMS :  Constitutional: Negative for chills and fever. Neurological: Negative for dizziness, tremors and speech change. OBJECTIVE:   VITALS:  /89   Pulse 74   Temp 98.8 °F (37.1 °C)   Resp 14   Ht 5' 7\" (1.702 m)   Wt 230 lb (104.3 kg)   SpO2 95%   BMI 36.02 kg/m²     PHYSICAL:  Constitutional: He appears well-developed and well-nourished. HENT:   Head: Normocephalic. Eyes: Pupils are equal, round, and reactive to light. Conjunctivae and EOM are normal.   Neck: No tracheal deviation present. Cardiovascular: Normal rate. Pulmonary/Chest: Effort normal.   Abdominal: He exhibits no distension. Musculoskeletal: Normal range of motion. Left elbow tenderness to palpation    Neurological:    Alert and oriented x3   Face symmetric   No pronator drift   Motor strength full   Sensation intact to light touch    Skin: Skin is warm and dry.    Psychiatric: Thought content normal.            DATA:  CBC:   Lab Results   Component Value Date    WBC 5.0 03/27/2019    RBC 4.00 03/27/2019    HGB 12.6 03/27/2019    HCT 38.2 03/27/2019    MCV 95.5 03/27/2019    MCH 31.5 03/27/2019    MCHC 33.0 03/27/2019    RDW 13.2 03/27/2019     03/27/2019    MPV 10.5 03/27/2019     BMP:    Lab Results   Component Value Date     03/27/2019    K 3.4 03/27/2019     03/27/2019    CO2 24 03/27/2019    BUN 10 03/27/2019    LABALBU 3.8 03/27/2019    CREATININE 0.8 03/27/2019    CALCIUM 8.3 03/27/2019    GFRAA >60 03/27/2019    LABGLOM >60 03/27/2019    GLUCOSE 112 03/27/2019     PT/INR:    Lab Results   Component Value Date    PROTIME 11.4 03/26/2019    INR 1.0 03/26/2019     PTT:    Lab Results   Component Value Date    APTT 25.6 03/26/2019   [APTT}    Current Inpatient Medications  Current Facility-Administered Medications: scopolamine (TRANSDERM-SCOP) transdermal patch 1 patch, 1 patch, Transdermal, Q72H  potassium bicarb-citric acid (EFFER-K) effervescent tablet 40 mEq, 40 mEq, Oral, Daily  trimethobenzamide (TIGAN) injection 200 mg, 200 mg, Intramuscular, Q6H PRN  morphine (PF) injection 2 mg, 2 mg, Intravenous, Q4H PRN  hydrALAZINE (APRESOLINE) injection 10 mg, 10 mg, Intravenous, Q30 Min PRN  labetalol (NORMODYNE;TRANDATE) injection 10 mg, 10 mg, Intravenous, Q30 Min PRN  levetiracetam (KEPPRA) 500 mg/100 mL IVPB, 500 mg, Intravenous, BID  sodium chloride flush 0.9 % injection 10 mL, 10 mL, Intravenous, 2 times per day  sodium chloride flush 0.9 % injection 10 mL, 10 mL, Intravenous, PRN  magnesium hydroxide (MILK OF MAGNESIA) 400 MG/5ML suspension 30 mL, 30 mL, Oral, Daily PRN  ondansetron (ZOFRAN) injection 4 mg, 4 mg, Intravenous, Q6H PRN  nicotine (NICODERM CQ) 21 MG/24HR 1 patch, 1 patch, Transdermal, Daily  thiamine (B-1) injection 100 mg, 100 mg, Intravenous, Daily  folic acid injection 1 mg, 1 mg, Intravenous, Daily  acetaminophen (TYLENOL) tablet 650 mg, 650 mg, Oral, Q4H  oxyCODONE (ROXICODONE) immediate release tablet 5 mg, 5 mg, Oral, Q4H PRN  perflutren lipid microspheres (DEFINITY) injection 1.65 mg, 1.5 mL, Intravenous, ONCE PRN    ASSESSMENT:   New problem: right frontal-temporal SDH s/p fall     Latdugn Calvert is 66years old. He presented with a syncopal episode. He was found passed out in his truck. He hit the back of his head. He suffered an occipital bone fracture. He has a right frontotemporal subdural hematoma measuring 8 mm in thickness. There is 7 mm of midline shift. The basal cisterns are patent. He is currently neurologically intact. No current surgical intervention is planned. 3/26 HCT: right frontal and temporal SDH measuring 8mm thick with local sulcal effacement and 7mm midline shift. The basal cisterns are patent.  Non-depressed occipital bone fx.     3/26 CT C spine- no fx    He is currently asymptomatic from the subdural hemorrhage. 3/26 Repeat HCT: stable. PLAN:  -No surgical intervention needed if neurological exam remains without any deficits. -SBP goal < 140   -Avoid hyponatremia  -Serial neurological exams  -Follow up in neurosurgery clinic in 1 month with repeat head CT   -keppra 500mg bid for 1 week. Electronically signed by Lisa Dimas PA-C on 3/28/2019 at 8:37 AM       I independently saw, evaluated, and examined the patient. Agree with plan outlined above.    Electronically signed by Soy Kellogg MD on 3/28/2019 at 1:24 PM

## 2019-03-28 NOTE — PROGRESS NOTES
[facility]  SURGICAL INTENSIVE CARE UNIT (SICU)  ATTENDING PHYSICIAN CRITICAL CARE PROGRESS NOTE     I have examined the patient, reviewed the record, and discussed the case with the APN/ resident. Please refer to the APN/ resident's note. I agree with the assessment and plan. I have reviewed all relevant labs and imaging data. The following summarizes my clinical findings and independent assessment. CC:  Critical care management for Mary Imogene Bassett Hospital Course/Overnight Events:  3/26--admitted after found down; found to have SDH with midline shift  3/27--no new issues overnight  3/28--ongoing headache/nausea overnight    Pt complains of ongoing headache, dizziness, and nausea.       Awake and alert  Follows commands  Hrt:  Regular  Lungs:  Fairly clear bilaterally  Abd:  Soft; BS+; NT/ND  Skin:  Warm/dry  Ext:  No sensorimotor deficits    Patient Active Problem List    Diagnosis Date Noted    Subdural hematoma ([facility] [address] 03/26/2019    Fracture of occipital bone of skull with loss of consciousness ([facility] [address] 03/26/2019       S/p found down  SDH--monitor neuro exam  Diet as tolerated  Electrolyte imbalance (hypokalemia)--correct as able  PT/OT evals  DVT risk--PCDs    Pt is at risk for neurologic deterioration and requires ongoing ICU care    Arnaldo Temple MD, FACS  3/28/2019  2:50 PM      Critical care time exclusive of teaching and procedures = 36 minutes

## 2019-03-28 NOTE — PROGRESS NOTES
Power midline cath Placement 3/28/2019    Product number: [id number]   Lot Number: [id number]      Ultrasound: yes   {Anatomy; iv placement site right brachial vein      Upper Arm Circumference: 33 cm    Size: 4.5 fr sl    Exposed Length: 3 cm    Internal Length: 12 cm   Cut: 0 cm   Vein Measurement: 0.40 cm    Paulo Jane  3/28/2019  3:26 PM

## 2019-03-28 NOTE — PROGRESS NOTES
tolerated sitting EOB ~3 minutes severe nausea/dizziness. See comments   demo G activity tolerance/endurance during [id number] min ADL task    Visual/  Perceptual Glasses: None present. Pt reports constant dizziness \"spinning\" with minimal change in head position when supine in bed. Pt able to tell correct time on wall. Glasses: None present. Pt reports constant dizziness; Able to tell # of fingers in front. Noted to have difficulty keeping L eye open during visual scanning exam which was [facility]     Safety F  P focus/attn due to medical status/dizziness G safety noted during ADL routine and functional mobility; minimal to no VC required for judgment, problem solving, safety awareness       Hand dominance: right       Strength ROM  Comments   RUE  Unable to formally assess Proximal:  -PROM: WFL   -AROM: >1/2  Distal: WFL G   F FMC/dexterity noted during ADL tasks   LUE Unable to formally assess Proximal:  -PROM: WFL  -AROM: >1/2  Distal: WFL G   F FMC/dexterity noted during ADL tasks      Hearing: WFL   Sensation:  Pt denies any numbness or tingling in BUE/BLE. Tone: WNL  Edema: Unremarkable     Vitals:   BP at rest: 176/94 BP at end of session: 162/79   HR at rest: 74 bpm HR at end of session: 70 bpm   Spo2 at rest: 98% on room air Spo2 at end of session: 97% on room air                             Comments/Treatment Narrative: Chart review complete for completion of re-evaluation this date. Pt approached supine in bed x2 during AM and also x2 during PM. Pt refusing for OOB activity due to dizziness and wanting to sleeping. Fourth attempt pt agreeable to try. Vitals monitored continuously during session. While in supine, pt required max VC to keep eyes open. Increased difficulty keeping L eye lid open during visual scanning assessment. Pt also noted to have shaking tremor prior to sitting EOB; LUE >RUE. RN notified of all this. Pt required max A x2 for supine to sit EOB.  Pt reports posterior neck pain when encouraged to extend neck. Still having dizziness while seated EOB ~3 minutes; BP taken 188/103. Session was terminated during high BP. Pt was assisted back into L side lying position; provided with wet clothe to wash face at SBA level. RN notified of pt position, elevated BP. Call light in place, all lines intact and all needs met.       Pt educated on OT role and importance of OOB activity.      Re-evaluation-     Assessment of current deficits   Functional mobility [x]                 ADLs [x]             Strength [x]                     Cognition [x]  Functional transfers  [x]        IADLs [x]            Safety Awareness [x]      Endurance [x]  Fine Motor Coordination [x]        Balance [x]         Vision/perception [x]       Sensation []         Gross Motor Coordination []     ROM []              Delirium []                  Motor Control []     Plan of Care:  ADL retraining [x]                                    Equipment needs [x]         Neuromuscular re-education [x] Energy Conservation Techniques [x]  Functional Transfer training [x]   Patient and/or Family Education [x]  Functional Mobility training [x]                Environmental Modifications [x]  Cognitive re-training [x]               Compensatory techniques for ADLs [x]  Splinting Needs []                                  Positioning to improve overall function [x]   Therapeutic Activity [x]                       Therapeutic Exercise  [x]  Visual/Perceptual: [x]                              Delirium prevention/treatment  [x]   Other:  []     Rehab Potential: Good for established goals     Patient / Family Goal:  None stated      Patient and/or family were instructed/educated on diagnosis, prognosis/goals and plan of care.  Demonstrated good understanding, further information not needed.     [] Malnutrition indicators have been identified and nursing has been notified to ensure a dietitian consult is ordered.       Re-evaluation + 38 treatment minutes  Time in: 08:45  Time out: 08:55  Time in: 14:07  Time out: 14:35    Charles Whelan, [address]

## 2019-03-28 NOTE — PROGRESS NOTES
Physical Therapy  ReEvaluation    Name: Fatou Kruger  : 1955  MRN: [id number]    Date of Service: 3/28/2019    Evaluating PT:  Mary Ellen Navarrete PT, DPT, [id number]    Room #:  9654/9504-S  Diagnosis:  SDH  Reason for admission: syncopal fall, skull fx, SDH midline shift  Precautions:  Falls, SBP goal <140, occipital bone fx   Procedures: none   Equipment recommendations:  TBD     Pt lives with fiance in a 1 story home with 2 stair(s) to enter and 1 rail(s). Bed is on 1st floor and bath is on 1st floor. Pt ambulated with no AD PTA. Pt independent for ADL performance. Initial Evaluation  Date: 3/27/19 Treatment  3/28 Short Term/ Long Term   Goals   AM-PAC 6 Clicks   -eval cut short by pt nausea and vomiting. Will need reassessed     Was pt agreeable to Eval/treatment? Yes  Yes     Does pt have pain?  \"11/10 headache\" \"11/10 headache\"    Bed Mobility  Rolling: supervision  Supine to sit: NT  Sit to supine: NT  Scooting: NT Rolling: NT  Supine to sit: MaxA x2  Sit to supine: MaxA x2  Scooting: NT independent   Transfers Sit to stand: NT  Stand to sit: NT  Stand pivot: NT  -unable to assess d/t vomiting NT independent   Ambulation    NT   NT >200 feet with no AD independent   Stair negotiation: ascended and descended  NT  >2 steps with 1 rail mod I   ROM BUE:  See OT eval  BLE:  WFL     Strength BUE:  See OT eval  BLE grossly:  Unable to formally assess  5/5   Balance Sitting EOB:  NT  Dynamic Standing:  NT  Sitting EOB:  Independent  Dynamic Standing: Independent       -Pt is A & O x 3  -RASS:  0  -CAM-ICU:  NT  -Sensation:  Pt denies numbness and tingling to extremities  -Edema:  Unremarkable     Vitals:  Heart rate at rest 74 Heart rate post session 70   SpO2 at rest 96% SpO2 post session 97%   Blood Pressure at rest 176/94  Blood pressure post session 162/79     Functional Status Score-Intensive Care Unit (FSS-ICU)   Rolling 4/7   Supine to sit transfer 2/7   Unsupported sitting  4/7   Sit to stand

## 2019-03-29 LAB
ALBUMIN SERPL-MCNC: 3.8 G/DL (ref 3.5–5.2)
ALP BLD-CCNC: 67 U/L (ref 40–129)
ALT SERPL-CCNC: 9 U/L (ref 0–40)
ANION GAP SERPL CALCULATED.3IONS-SCNC: 12 MMOL/L (ref 7–16)
AST SERPL-CCNC: 16 U/L (ref 0–39)
BASOPHILS ABSOLUTE: 0.02 E9/L (ref 0–0.2)
BASOPHILS RELATIVE PERCENT: 0.3 % (ref 0–2)
BILIRUB SERPL-MCNC: 0.5 MG/DL (ref 0–1.2)
BUN BLDV-MCNC: 11 MG/DL (ref 8–23)
CALCIUM IONIZED: 1.27 MMOL/L (ref 1.15–1.33)
CALCIUM SERPL-MCNC: 8.7 MG/DL (ref 8.6–10.2)
CHLORIDE BLD-SCNC: 101 MMOL/L (ref 98–107)
CO2: 22 MMOL/L (ref 22–29)
CREAT SERPL-MCNC: 0.8 MG/DL (ref 0.7–1.2)
EKG ATRIAL RATE: 96 BPM
EKG P AXIS: 49 DEGREES
EKG P-R INTERVAL: 156 MS
EKG Q-T INTERVAL: 362 MS
EKG QRS DURATION: 88 MS
EKG QTC CALCULATION (BAZETT): 457 MS
EKG R AXIS: 12 DEGREES
EKG T AXIS: -27 DEGREES
EKG VENTRICULAR RATE: 96 BPM
EOSINOPHILS ABSOLUTE: 0.07 E9/L (ref 0.05–0.5)
EOSINOPHILS RELATIVE PERCENT: 1 % (ref 0–6)
GFR AFRICAN AMERICAN: >60
GFR NON-AFRICAN AMERICAN: >60 ML/MIN/1.73
GLUCOSE BLD-MCNC: 131 MG/DL (ref 74–99)
HCT VFR BLD CALC: 41.1 % (ref 37–54)
HEMOGLOBIN: 13.6 G/DL (ref 12.5–16.5)
IMMATURE GRANULOCYTES #: 0.03 E9/L
IMMATURE GRANULOCYTES %: 0.4 % (ref 0–5)
LYMPHOCYTES ABSOLUTE: 0.81 E9/L (ref 1.5–4)
LYMPHOCYTES RELATIVE PERCENT: 11 % (ref 20–42)
MAGNESIUM: 2.1 MG/DL (ref 1.6–2.6)
MCH RBC QN AUTO: 31.5 PG (ref 26–35)
MCHC RBC AUTO-ENTMCNC: 33.1 % (ref 32–34.5)
MCV RBC AUTO: 95.1 FL (ref 80–99.9)
MONOCYTES ABSOLUTE: 1.04 E9/L (ref 0.1–0.95)
MONOCYTES RELATIVE PERCENT: 14.1 % (ref 2–12)
NEUTROPHILS ABSOLUTE: 5.38 E9/L (ref 1.8–7.3)
NEUTROPHILS RELATIVE PERCENT: 73.2 % (ref 43–80)
PDW BLD-RTO: 13.5 FL (ref 11.5–15)
PHOSPHORUS: 2.3 MG/DL (ref 2.5–4.5)
PLATELET # BLD: 235 E9/L (ref 130–450)
PMV BLD AUTO: 10.2 FL (ref 7–12)
POTASSIUM REFLEX MAGNESIUM: 3.9 MMOL/L (ref 3.5–5)
RBC # BLD: 4.32 E12/L (ref 3.8–5.8)
SODIUM BLD-SCNC: 135 MMOL/L (ref 132–146)
TOTAL PROTEIN: 7.2 G/DL (ref 6.4–8.3)
WBC # BLD: 7.4 E9/L (ref 4.5–11.5)

## 2019-03-29 PROCEDURE — 2500000003 HC RX 250 WO HCPCS: Performed by: STUDENT IN AN ORGANIZED HEALTH CARE EDUCATION/TRAINING PROGRAM

## 2019-03-29 PROCEDURE — 6360000002 HC RX W HCPCS: Performed by: EMERGENCY MEDICINE

## 2019-03-29 PROCEDURE — 6360000002 HC RX W HCPCS: Performed by: STUDENT IN AN ORGANIZED HEALTH CARE EDUCATION/TRAINING PROGRAM

## 2019-03-29 PROCEDURE — 6370000000 HC RX 637 (ALT 250 FOR IP): Performed by: SURGERY

## 2019-03-29 PROCEDURE — 99232 SBSQ HOSP IP/OBS MODERATE 35: CPT | Performed by: NEUROLOGICAL SURGERY

## 2019-03-29 PROCEDURE — 6370000000 HC RX 637 (ALT 250 FOR IP): Performed by: NURSE PRACTITIONER

## 2019-03-29 PROCEDURE — 6360000002 HC RX W HCPCS: Performed by: SURGERY

## 2019-03-29 PROCEDURE — 83735 ASSAY OF MAGNESIUM: CPT

## 2019-03-29 PROCEDURE — 2500000003 HC RX 250 WO HCPCS: Performed by: EMERGENCY MEDICINE

## 2019-03-29 PROCEDURE — 6370000000 HC RX 637 (ALT 250 FOR IP): Performed by: STUDENT IN AN ORGANIZED HEALTH CARE EDUCATION/TRAINING PROGRAM

## 2019-03-29 PROCEDURE — 85025 COMPLETE CBC W/AUTO DIFF WBC: CPT

## 2019-03-29 PROCEDURE — 84100 ASSAY OF PHOSPHORUS: CPT

## 2019-03-29 PROCEDURE — C9113 INJ PANTOPRAZOLE SODIUM, VIA: HCPCS | Performed by: SURGERY

## 2019-03-29 PROCEDURE — 2580000003 HC RX 258: Performed by: STUDENT IN AN ORGANIZED HEALTH CARE EDUCATION/TRAINING PROGRAM

## 2019-03-29 PROCEDURE — 80053 COMPREHEN METABOLIC PANEL: CPT

## 2019-03-29 PROCEDURE — 99233 SBSQ HOSP IP/OBS HIGH 50: CPT | Performed by: SURGERY

## 2019-03-29 PROCEDURE — 82330 ASSAY OF CALCIUM: CPT

## 2019-03-29 PROCEDURE — 2580000003 HC RX 258: Performed by: SURGERY

## 2019-03-29 PROCEDURE — 6370000000 HC RX 637 (ALT 250 FOR IP): Performed by: NEUROLOGICAL SURGERY

## 2019-03-29 PROCEDURE — 2060000000 HC ICU INTERMEDIATE R&B

## 2019-03-29 RX ORDER — LOSARTAN POTASSIUM 25 MG/1
25 TABLET ORAL DAILY
Status: DISCONTINUED | OUTPATIENT
Start: 2019-03-29 | End: 2019-04-02 | Stop reason: HOSPADM

## 2019-03-29 RX ORDER — SODIUM CHLORIDE 9 MG/ML
INJECTION, SOLUTION INTRAVENOUS CONTINUOUS
Status: DISCONTINUED | OUTPATIENT
Start: 2019-03-29 | End: 2019-03-30

## 2019-03-29 RX ORDER — METHOCARBAMOL 500 MG/1
1000 TABLET, FILM COATED ORAL 4 TIMES DAILY
Status: DISCONTINUED | OUTPATIENT
Start: 2019-03-29 | End: 2019-04-02 | Stop reason: HOSPADM

## 2019-03-29 RX ORDER — PANTOPRAZOLE SODIUM 40 MG/10ML
40 INJECTION, POWDER, LYOPHILIZED, FOR SOLUTION INTRAVENOUS DAILY
Status: DISCONTINUED | OUTPATIENT
Start: 2019-03-29 | End: 2019-03-30

## 2019-03-29 RX ORDER — DIVALPROEX SODIUM 500 MG/1
500 TABLET, EXTENDED RELEASE ORAL DAILY
Status: DISCONTINUED | OUTPATIENT
Start: 2019-03-29 | End: 2019-04-02 | Stop reason: HOSPADM

## 2019-03-29 RX ORDER — CLONIDINE HYDROCHLORIDE 0.1 MG/1
0.1 TABLET ORAL 2 TIMES DAILY
Status: DISCONTINUED | OUTPATIENT
Start: 2019-03-29 | End: 2019-03-30

## 2019-03-29 RX ORDER — 0.9 % SODIUM CHLORIDE 0.9 %
10 VIAL (ML) INJECTION DAILY
Status: DISCONTINUED | OUTPATIENT
Start: 2019-03-29 | End: 2019-03-30

## 2019-03-29 RX ADMIN — MORPHINE SULFATE 2 MG: 2 INJECTION, SOLUTION INTRAMUSCULAR; INTRAVENOUS at 22:14

## 2019-03-29 RX ADMIN — ACETAMINOPHEN 650 MG: 325 TABLET, FILM COATED ORAL at 20:00

## 2019-03-29 RX ADMIN — METHOCARBAMOL TABLETS 1000 MG: 500 TABLET, COATED ORAL at 23:04

## 2019-03-29 RX ADMIN — ENOXAPARIN SODIUM 30 MG: 30 INJECTION SUBCUTANEOUS at 21:19

## 2019-03-29 RX ADMIN — FOLIC ACID 1 MG: 5 INJECTION, SOLUTION INTRAMUSCULAR; INTRAVENOUS; SUBCUTANEOUS at 08:45

## 2019-03-29 RX ADMIN — MORPHINE SULFATE 2 MG: 2 INJECTION, SOLUTION INTRAMUSCULAR; INTRAVENOUS at 17:48

## 2019-03-29 RX ADMIN — CLONIDINE HYDROCHLORIDE 0.1 MG: 0.1 TABLET ORAL at 21:19

## 2019-03-29 RX ADMIN — Medication 10 ML: at 17:49

## 2019-03-29 RX ADMIN — LABETALOL HYDROCHLORIDE 10 MG: 5 INJECTION INTRAVENOUS at 09:50

## 2019-03-29 RX ADMIN — LABETALOL HYDROCHLORIDE 10 MG: 5 INJECTION INTRAVENOUS at 20:11

## 2019-03-29 RX ADMIN — LABETALOL HYDROCHLORIDE 10 MG: 5 INJECTION INTRAVENOUS at 11:29

## 2019-03-29 RX ADMIN — SODIUM CHLORIDE: 9 INJECTION, SOLUTION INTRAVENOUS at 21:24

## 2019-03-29 RX ADMIN — METHOCARBAMOL TABLETS 1000 MG: 500 TABLET, COATED ORAL at 20:00

## 2019-03-29 RX ADMIN — ACETAMINOPHEN 650 MG: 325 TABLET, FILM COATED ORAL at 16:03

## 2019-03-29 RX ADMIN — THIAMINE HYDROCHLORIDE 100 MG: 100 INJECTION, SOLUTION INTRAMUSCULAR; INTRAVENOUS at 08:40

## 2019-03-29 RX ADMIN — HYDRALAZINE HYDROCHLORIDE 10 MG: 20 INJECTION INTRAMUSCULAR; INTRAVENOUS at 16:05

## 2019-03-29 RX ADMIN — Medication 10 ML: at 11:29

## 2019-03-29 RX ADMIN — SODIUM CHLORIDE: 9 INJECTION, SOLUTION INTRAVENOUS at 08:38

## 2019-03-29 RX ADMIN — ACETAMINOPHEN 650 MG: 325 TABLET, FILM COATED ORAL at 23:04

## 2019-03-29 RX ADMIN — LEVETIRACETAM 500 MG: 5 INJECTION INTRAVENOUS at 21:21

## 2019-03-29 RX ADMIN — POTASSIUM BICARBONATE 40 MEQ: 782 TABLET, EFFERVESCENT ORAL at 08:40

## 2019-03-29 RX ADMIN — METHOCARBAMOL TABLETS 1000 MG: 500 TABLET, COATED ORAL at 14:40

## 2019-03-29 RX ADMIN — LEVETIRACETAM 500 MG: 5 INJECTION INTRAVENOUS at 08:38

## 2019-03-29 RX ADMIN — PANTOPRAZOLE SODIUM 40 MG: 40 INJECTION, POWDER, FOR SOLUTION INTRAVENOUS at 11:29

## 2019-03-29 RX ADMIN — MORPHINE SULFATE 2 MG: 2 INJECTION, SOLUTION INTRAMUSCULAR; INTRAVENOUS at 02:21

## 2019-03-29 RX ADMIN — ACETAMINOPHEN 650 MG: 325 TABLET, FILM COATED ORAL at 11:29

## 2019-03-29 RX ADMIN — MORPHINE SULFATE 2 MG: 2 INJECTION, SOLUTION INTRAMUSCULAR; INTRAVENOUS at 13:03

## 2019-03-29 RX ADMIN — LOSARTAN POTASSIUM 25 MG: 25 TABLET, FILM COATED ORAL at 09:00

## 2019-03-29 RX ADMIN — HYDRALAZINE HYDROCHLORIDE 10 MG: 20 INJECTION INTRAMUSCULAR; INTRAVENOUS at 08:39

## 2019-03-29 RX ADMIN — DIVALPROEX SODIUM 500 MG: 500 TABLET, EXTENDED RELEASE ORAL at 16:03

## 2019-03-29 RX ADMIN — MORPHINE SULFATE 2 MG: 2 INJECTION, SOLUTION INTRAMUSCULAR; INTRAVENOUS at 08:40

## 2019-03-29 ASSESSMENT — PAIN DESCRIPTION - LOCATION
LOCATION: HEAD

## 2019-03-29 ASSESSMENT — PAIN SCALES - GENERAL
PAINLEVEL_OUTOF10: 8
PAINLEVEL_OUTOF10: 10
PAINLEVEL_OUTOF10: 8
PAINLEVEL_OUTOF10: 10
PAINLEVEL_OUTOF10: 7

## 2019-03-29 ASSESSMENT — PAIN DESCRIPTION - DESCRIPTORS
DESCRIPTORS: THROBBING;HEADACHE
DESCRIPTORS: HEADACHE;THROBBING
DESCRIPTORS: ACHING;THROBBING;CONSTANT
DESCRIPTORS: THROBBING;HEADACHE

## 2019-03-29 ASSESSMENT — PAIN DESCRIPTION - ONSET
ONSET: ON-GOING

## 2019-03-29 ASSESSMENT — PAIN - FUNCTIONAL ASSESSMENT
PAIN_FUNCTIONAL_ASSESSMENT: PREVENTS OR INTERFERES SOME ACTIVE ACTIVITIES AND ADLS

## 2019-03-29 ASSESSMENT — PAIN DESCRIPTION - FREQUENCY
FREQUENCY: CONTINUOUS

## 2019-03-29 ASSESSMENT — PAIN DESCRIPTION - PAIN TYPE
TYPE: ACUTE PAIN

## 2019-03-29 ASSESSMENT — PAIN DESCRIPTION - PROGRESSION
CLINICAL_PROGRESSION: GRADUALLY WORSENING
CLINICAL_PROGRESSION: NOT CHANGED
CLINICAL_PROGRESSION: NOT CHANGED

## 2019-03-29 ASSESSMENT — PAIN DESCRIPTION - ORIENTATION
ORIENTATION: POSTERIOR;ANTERIOR
ORIENTATION: POSTERIOR;ANTERIOR

## 2019-03-29 NOTE — CARE COORDINATION
Patient's  for vladimir Onofre is Umesh Rivera, [phone], fax: [phone]. She will be calling for nursing updates over the weekend to the nursing unit. Next visit planned for Monday. Requested PM&R consult. Therapy to see again and update notes. Patient's BP remains high, medications adjusted. Await input from acute rehab. Will continue to follow for further transition of care planning needs.      Sneha Schafer.

## 2019-03-29 NOTE — PROGRESS NOTES
Facility-Administered Medications: 0.9 % sodium chloride infusion, , Intravenous, Continuous  losartan (COZAAR) tablet 25 mg, 25 mg, Oral, Daily  scopolamine (TRANSDERM-SCOP) transdermal patch 1 patch, 1 patch, Transdermal, Q72H  potassium bicarb-citric acid (EFFER-K) effervescent tablet 40 mEq, 40 mEq, Oral, Daily  trimethobenzamide (TIGAN) injection 200 mg, 200 mg, Intramuscular, Q6H PRN  morphine (PF) injection 2 mg, 2 mg, Intravenous, Q4H PRN  hydrALAZINE (APRESOLINE) injection 10 mg, 10 mg, Intravenous, Q30 Min PRN  labetalol (NORMODYNE;TRANDATE) injection 10 mg, 10 mg, Intravenous, Q30 Min PRN  levetiracetam (KEPPRA) 500 mg/100 mL IVPB, 500 mg, Intravenous, BID  sodium chloride flush 0.9 % injection 10 mL, 10 mL, Intravenous, 2 times per day  sodium chloride flush 0.9 % injection 10 mL, 10 mL, Intravenous, PRN  magnesium hydroxide (MILK OF MAGNESIA) 400 MG/5ML suspension 30 mL, 30 mL, Oral, Daily PRN  ondansetron (ZOFRAN) injection 4 mg, 4 mg, Intravenous, Q6H PRN  nicotine (NICODERM CQ) 21 MG/24HR 1 patch, 1 patch, Transdermal, Daily  thiamine (B-1) injection 100 mg, 100 mg, Intravenous, Daily  folic acid injection 1 mg, 1 mg, Intravenous, Daily  acetaminophen (TYLENOL) tablet 650 mg, 650 mg, Oral, Q4H  oxyCODONE (ROXICODONE) immediate release tablet 5 mg, 5 mg, Oral, Q4H PRN  perflutren lipid microspheres (DEFINITY) injection 1.65 mg, 1.5 mL, Intravenous, ONCE PRN    ASSESSMENT:   New problem: right frontal-temporal SDH s/p fall     Papi Calvert is 66years old. He presented with a syncopal episode. He was found passed out in his truck. He hit the back of his head. He suffered an occipital bone fracture. He has a right frontotemporal subdural hematoma measuring 8 mm in thickness. There is 7 mm of midline shift. The basal cisterns are patent. He is currently neurologically intact. No current surgical intervention is planned.     3/26 HCT: right frontal and temporal SDH measuring 8mm thick with local

## 2019-03-29 NOTE — CARE COORDINATION
Call received from Braxton Hopper, [phone], with [facility] requesting updated clinical information. She will be up to meet with the patient around noon today and requested printed information and update to be available at that time. Information for updated printed and ready for Braxton Hopper to pick-up. Will continue to follow for further transition of care planning needs.      Ke Singh.

## 2019-03-29 NOTE — PROGRESS NOTES
[facility]  Daily Trauma Progress Note  Attending      Admit Date: 3/26/2019    Hospital day 3    Other syncopal fall    INJURIES:   Patient Active Problem List   Diagnosis    Subdural hematoma ([address]    Fracture of occipital bone of skull with loss of consciousness (HCC)    Hypokalemia    Electrolyte imbalance       PREVIOUS 24 HOUR EVENTS: transferred from ICU; c/w severe headache    Data Review  Data  CBC with Differential:    Lab Results   Component Value Date    WBC 7.4 03/29/2019    RBC 4.32 03/29/2019    HGB 13.6 03/29/2019    HCT 41.1 03/29/2019     03/29/2019    MCV 95.1 03/29/2019    MCH 31.5 03/29/2019    MCHC 33.1 03/29/2019    RDW 13.5 03/29/2019    LYMPHOPCT 11.0 03/29/2019    MONOPCT 14.1 03/29/2019    BASOPCT 0.3 03/29/2019    MONOSABS 1.04 03/29/2019    LYMPHSABS 0.81 03/29/2019    EOSABS 0.07 03/29/2019    BASOSABS 0.02 03/29/2019     CMP:    Lab Results   Component Value Date     03/29/2019    K 3.9 03/29/2019     03/29/2019    CO2 22 03/29/2019    BUN 11 03/29/2019    CREATININE 0.8 03/29/2019    GFRAA >60 03/29/2019    LABGLOM >60 03/29/2019    GLUCOSE 131 03/29/2019    PROT 7.2 03/29/2019    LABALBU 3.8 03/29/2019    CALCIUM 8.7 03/29/2019    BILITOT 0.5 03/29/2019    ALKPHOS 67 03/29/2019    AST 16 03/29/2019    ALT 9 03/29/2019     Radiology: reviewed    MEDICATIONS:   Scheduled Meds:   losartan  25 mg Oral Daily    methocarbamol  1,000 mg Oral 4x Daily    scopolamine  1 patch Transdermal Q72H    potassium bicarb-citric acid  40 mEq Oral Daily    levetiracetam  500 mg Intravenous BID    sodium chloride flush  10 mL Intravenous 2 times per day    nicotine  1 patch Transdermal Daily    thiamine  100 mg Intravenous Daily    folic acid  1 mg Intravenous Daily    acetaminophen  650 mg Oral Q4H     Continuous Infusions:   sodium chloride 75 mL/hr at 03/29/19 0838     PRN Meds:trimethobenzamide, morphine, hydrALAZINE, labetalol, sodium chloride flush, magnesium hydroxide, ondansetron, oxyCODONE, perflutren lipid microspheres    Subjective:     C/o severe nausea and headache. The medications he has prescribed are still not working    Objective:     Patient Vitals for the past 8 hrs:   BP Temp Temp src Pulse Resp SpO2   03/29/19 1020 (!) 142/82 -- -- -- -- --   03/29/19 0953 (!) 164/94 -- -- -- -- --   03/29/19 0947 (!) 168/94 -- -- 91 -- --   03/29/19 0945 (!) 167/85 -- -- 78 -- --   03/29/19 0820 (!) 178/92 98.6 °F (37 °C) Temporal 61 18 92 %   03/29/19 0438 (!) 152/82 99.3 °F (37.4 °C) -- 64 16 96 %     I/O last 3 completed shifts: In: 6967 [I.V.:1088; IV Piggyback:200]  Out: 2192 [CCSYU:6017]  I/O this shift:  In: -   Out: 500 [Urine:500]    PHYSICAL:  General appearance:  Comfortable. Pain Description: severe--headache  GCS:    4 - Opens eyes on own   6 - Follows simple motor commands  5 - Alert and oriented  HEENT:  Eyes clear. PERRLA. Chest: Clear to ausculation bilaterally.     CV:  RRR    Abdomen:  SNTND +BS  Extremities:  PEREZ x 4  Skin:  Warm & dry  Vascular:peripheral pulses symmetrical    ASSESSMENT & PLAN:     System  Diagnostic & Treatment Plans   Respiratory     No active issues    Cardiovascular     HTN--Cozaar started, c/w PRNs, pain control  Syncope--check orthostatics   Neurological     TBI--Keppra x 7 days; NSG following  Severe headache--control BP, add robaxin, will try imitrex if not resolving   Hematological/Coagulation     No active issues    Infectious Disease     No active issues    Renal/Fluids/Electrolytes/Acid base     Monitor sodium   Gastrointestinal     Diet as tolerated   Endocrine     No active issues    Musculoskeletal     No active issues    Pain Control/Sedation     Add robaxin   PT/OT/Rehab     [facility] 11/24   VTE Prophylaxis     lovenox   GI prophylaxis     protonix   Comments/Other  Code status   full   Discharge Planning     Pending tolerating diet and pain control     Vicki Gill  3/29/2019   10:27

## 2019-03-29 NOTE — PLAN OF CARE
Problem: Falls - Risk of:  Goal: Will remain free from falls  Description  Will remain free from falls  Outcome: Met This Shift  Goal: Absence of physical injury  Description  Absence of physical injury  Outcome: Met This Shift     Problem: Pain:  Description  Pain management should include both nonpharmacologic and pharmacologic interventions. Goal: Pain level will decrease  Description  Pain level will decrease  Outcome: Met This Shift  Goal: Control of acute pain  Description  Control of acute pain  Outcome: Met This Shift     Problem: Anxiety/Stress:  Goal: Level of anxiety will decrease  Description  Level of anxiety will decrease  Outcome: Met This Shift     Problem: Risk for Impaired Skin Integrity  Goal: Tissue integrity - skin and mucous membranes  Description  Structural intactness and normal physiological function of skin and  mucous membranes.   Outcome: Met This Shift

## 2019-03-30 LAB
ANION GAP SERPL CALCULATED.3IONS-SCNC: 11 MMOL/L (ref 7–16)
BASOPHILS ABSOLUTE: 0.02 E9/L (ref 0–0.2)
BASOPHILS RELATIVE PERCENT: 0.4 % (ref 0–2)
BUN BLDV-MCNC: 11 MG/DL (ref 8–23)
CALCIUM SERPL-MCNC: 8.5 MG/DL (ref 8.6–10.2)
CHLORIDE BLD-SCNC: 102 MMOL/L (ref 98–107)
CO2: 23 MMOL/L (ref 22–29)
CREAT SERPL-MCNC: 0.7 MG/DL (ref 0.7–1.2)
EOSINOPHILS ABSOLUTE: 0.16 E9/L (ref 0.05–0.5)
EOSINOPHILS RELATIVE PERCENT: 2.9 % (ref 0–6)
GFR AFRICAN AMERICAN: >60
GFR NON-AFRICAN AMERICAN: >60 ML/MIN/1.73
GLUCOSE BLD-MCNC: 108 MG/DL (ref 74–99)
HCT VFR BLD CALC: 39.3 % (ref 37–54)
HEMOGLOBIN: 13 G/DL (ref 12.5–16.5)
IMMATURE GRANULOCYTES #: 0.01 E9/L
IMMATURE GRANULOCYTES %: 0.2 % (ref 0–5)
LYMPHOCYTES ABSOLUTE: 1.05 E9/L (ref 1.5–4)
LYMPHOCYTES RELATIVE PERCENT: 18.8 % (ref 20–42)
MCH RBC QN AUTO: 31.6 PG (ref 26–35)
MCHC RBC AUTO-ENTMCNC: 33.1 % (ref 32–34.5)
MCV RBC AUTO: 95.4 FL (ref 80–99.9)
MONOCYTES ABSOLUTE: 0.83 E9/L (ref 0.1–0.95)
MONOCYTES RELATIVE PERCENT: 14.8 % (ref 2–12)
NEUTROPHILS ABSOLUTE: 3.53 E9/L (ref 1.8–7.3)
NEUTROPHILS RELATIVE PERCENT: 62.9 % (ref 43–80)
PDW BLD-RTO: 13.3 FL (ref 11.5–15)
PLATELET # BLD: 239 E9/L (ref 130–450)
PMV BLD AUTO: 10 FL (ref 7–12)
POTASSIUM REFLEX MAGNESIUM: 4.1 MMOL/L (ref 3.5–5)
RBC # BLD: 4.12 E12/L (ref 3.8–5.8)
SODIUM BLD-SCNC: 136 MMOL/L (ref 132–146)
WBC # BLD: 5.6 E9/L (ref 4.5–11.5)

## 2019-03-30 PROCEDURE — 6370000000 HC RX 637 (ALT 250 FOR IP): Performed by: STUDENT IN AN ORGANIZED HEALTH CARE EDUCATION/TRAINING PROGRAM

## 2019-03-30 PROCEDURE — C9113 INJ PANTOPRAZOLE SODIUM, VIA: HCPCS | Performed by: SURGERY

## 2019-03-30 PROCEDURE — 6370000000 HC RX 637 (ALT 250 FOR IP): Performed by: NEUROLOGICAL SURGERY

## 2019-03-30 PROCEDURE — 2500000003 HC RX 250 WO HCPCS: Performed by: STUDENT IN AN ORGANIZED HEALTH CARE EDUCATION/TRAINING PROGRAM

## 2019-03-30 PROCEDURE — 6370000000 HC RX 637 (ALT 250 FOR IP): Performed by: SURGERY

## 2019-03-30 PROCEDURE — 99233 SBSQ HOSP IP/OBS HIGH 50: CPT | Performed by: SURGERY

## 2019-03-30 PROCEDURE — 2060000000 HC ICU INTERMEDIATE R&B

## 2019-03-30 PROCEDURE — 6360000002 HC RX W HCPCS: Performed by: SURGERY

## 2019-03-30 PROCEDURE — 80048 BASIC METABOLIC PNL TOTAL CA: CPT

## 2019-03-30 PROCEDURE — 36415 COLL VENOUS BLD VENIPUNCTURE: CPT

## 2019-03-30 PROCEDURE — 6370000000 HC RX 637 (ALT 250 FOR IP): Performed by: NURSE PRACTITIONER

## 2019-03-30 PROCEDURE — 2580000003 HC RX 258: Performed by: STUDENT IN AN ORGANIZED HEALTH CARE EDUCATION/TRAINING PROGRAM

## 2019-03-30 PROCEDURE — 6360000002 HC RX W HCPCS: Performed by: STUDENT IN AN ORGANIZED HEALTH CARE EDUCATION/TRAINING PROGRAM

## 2019-03-30 PROCEDURE — 2580000003 HC RX 258: Performed by: SURGERY

## 2019-03-30 PROCEDURE — 99232 SBSQ HOSP IP/OBS MODERATE 35: CPT | Performed by: NEUROLOGICAL SURGERY

## 2019-03-30 PROCEDURE — 85025 COMPLETE CBC W/AUTO DIFF WBC: CPT

## 2019-03-30 RX ORDER — CLONIDINE HYDROCHLORIDE 0.2 MG/1
0.2 TABLET ORAL 2 TIMES DAILY
Status: DISCONTINUED | OUTPATIENT
Start: 2019-03-30 | End: 2019-04-02 | Stop reason: HOSPADM

## 2019-03-30 RX ORDER — PANTOPRAZOLE SODIUM 40 MG/1
40 TABLET, DELAYED RELEASE ORAL
Status: DISCONTINUED | OUTPATIENT
Start: 2019-03-31 | End: 2019-04-02 | Stop reason: HOSPADM

## 2019-03-30 RX ORDER — SUMATRIPTAN 50 MG/1
50 TABLET, FILM COATED ORAL ONCE
Status: COMPLETED | OUTPATIENT
Start: 2019-03-30 | End: 2019-03-30

## 2019-03-30 RX ORDER — LEVETIRACETAM 500 MG/1
500 TABLET ORAL 2 TIMES DAILY
Status: COMPLETED | OUTPATIENT
Start: 2019-03-30 | End: 2019-04-02

## 2019-03-30 RX ADMIN — SUMATRIPTAN SUCCINATE 50 MG: 50 TABLET, FILM COATED ORAL at 10:16

## 2019-03-30 RX ADMIN — CLONIDINE HYDROCHLORIDE 0.2 MG: 0.2 TABLET ORAL at 09:03

## 2019-03-30 RX ADMIN — METHOCARBAMOL TABLETS 1000 MG: 500 TABLET, COATED ORAL at 13:13

## 2019-03-30 RX ADMIN — ACETAMINOPHEN 650 MG: 325 TABLET, FILM COATED ORAL at 19:37

## 2019-03-30 RX ADMIN — FOLIC ACID 1 MG: 5 INJECTION, SOLUTION INTRAMUSCULAR; INTRAVENOUS; SUBCUTANEOUS at 10:17

## 2019-03-30 RX ADMIN — METHOCARBAMOL TABLETS 1000 MG: 500 TABLET, COATED ORAL at 17:04

## 2019-03-30 RX ADMIN — METHOCARBAMOL TABLETS 1000 MG: 500 TABLET, COATED ORAL at 09:03

## 2019-03-30 RX ADMIN — OXYCODONE HYDROCHLORIDE 5 MG: 5 TABLET ORAL at 17:04

## 2019-03-30 RX ADMIN — LOSARTAN POTASSIUM 25 MG: 25 TABLET, FILM COATED ORAL at 09:03

## 2019-03-30 RX ADMIN — DIVALPROEX SODIUM 500 MG: 500 TABLET, EXTENDED RELEASE ORAL at 09:03

## 2019-03-30 RX ADMIN — MORPHINE SULFATE 2 MG: 2 INJECTION, SOLUTION INTRAMUSCULAR; INTRAVENOUS at 10:16

## 2019-03-30 RX ADMIN — Medication 10 ML: at 09:05

## 2019-03-30 RX ADMIN — MORPHINE SULFATE 2 MG: 2 INJECTION, SOLUTION INTRAMUSCULAR; INTRAVENOUS at 14:20

## 2019-03-30 RX ADMIN — MORPHINE SULFATE 2 MG: 2 INJECTION, SOLUTION INTRAMUSCULAR; INTRAVENOUS at 02:15

## 2019-03-30 RX ADMIN — MORPHINE SULFATE 2 MG: 2 INJECTION, SOLUTION INTRAMUSCULAR; INTRAVENOUS at 06:16

## 2019-03-30 RX ADMIN — LEVETIRACETAM 500 MG: 5 INJECTION INTRAVENOUS at 09:07

## 2019-03-30 RX ADMIN — ENOXAPARIN SODIUM 30 MG: 30 INJECTION SUBCUTANEOUS at 20:33

## 2019-03-30 RX ADMIN — LEVETIRACETAM 500 MG: 500 TABLET, FILM COATED ORAL at 20:32

## 2019-03-30 RX ADMIN — Medication 10 ML: at 14:21

## 2019-03-30 RX ADMIN — ACETAMINOPHEN 650 MG: 325 TABLET, FILM COATED ORAL at 23:43

## 2019-03-30 RX ADMIN — ACETAMINOPHEN 650 MG: 325 TABLET, FILM COATED ORAL at 07:19

## 2019-03-30 RX ADMIN — THIAMINE HYDROCHLORIDE 100 MG: 100 INJECTION, SOLUTION INTRAMUSCULAR; INTRAVENOUS at 09:04

## 2019-03-30 RX ADMIN — Medication 10 ML: at 20:33

## 2019-03-30 RX ADMIN — ENOXAPARIN SODIUM 30 MG: 30 INJECTION SUBCUTANEOUS at 09:03

## 2019-03-30 RX ADMIN — MORPHINE SULFATE 2 MG: 2 INJECTION, SOLUTION INTRAMUSCULAR; INTRAVENOUS at 18:25

## 2019-03-30 RX ADMIN — ACETAMINOPHEN 650 MG: 325 TABLET, FILM COATED ORAL at 14:20

## 2019-03-30 RX ADMIN — CLONIDINE HYDROCHLORIDE 0.2 MG: 0.2 TABLET ORAL at 20:32

## 2019-03-30 RX ADMIN — LABETALOL HYDROCHLORIDE 10 MG: 5 INJECTION INTRAVENOUS at 06:16

## 2019-03-30 RX ADMIN — POTASSIUM BICARBONATE 40 MEQ: 782 TABLET, EFFERVESCENT ORAL at 09:04

## 2019-03-30 RX ADMIN — METHOCARBAMOL TABLETS 1000 MG: 500 TABLET, COATED ORAL at 20:33

## 2019-03-30 RX ADMIN — SODIUM CHLORIDE: 9 INJECTION, SOLUTION INTRAVENOUS at 09:12

## 2019-03-30 RX ADMIN — MORPHINE SULFATE 2 MG: 2 INJECTION, SOLUTION INTRAMUSCULAR; INTRAVENOUS at 23:01

## 2019-03-30 RX ADMIN — PANTOPRAZOLE SODIUM 40 MG: 40 INJECTION, POWDER, FOR SOLUTION INTRAVENOUS at 09:05

## 2019-03-30 ASSESSMENT — PAIN DESCRIPTION - LOCATION
LOCATION: HEAD

## 2019-03-30 ASSESSMENT — PAIN SCALES - GENERAL
PAINLEVEL_OUTOF10: 10
PAINLEVEL_OUTOF10: 8
PAINLEVEL_OUTOF10: 7
PAINLEVEL_OUTOF10: 10
PAINLEVEL_OUTOF10: 9
PAINLEVEL_OUTOF10: 10
PAINLEVEL_OUTOF10: 0
PAINLEVEL_OUTOF10: 9
PAINLEVEL_OUTOF10: 0
PAINLEVEL_OUTOF10: 10
PAINLEVEL_OUTOF10: 6
PAINLEVEL_OUTOF10: 10
PAINLEVEL_OUTOF10: 10
PAINLEVEL_OUTOF10: 8
PAINLEVEL_OUTOF10: 5
PAINLEVEL_OUTOF10: 10
PAINLEVEL_OUTOF10: 9
PAINLEVEL_OUTOF10: 5
PAINLEVEL_OUTOF10: 10
PAINLEVEL_OUTOF10: 10

## 2019-03-30 ASSESSMENT — PAIN DESCRIPTION - ONSET
ONSET: AWAKENED FROM SLEEP
ONSET: ON-GOING

## 2019-03-30 ASSESSMENT — PAIN DESCRIPTION - PROGRESSION
CLINICAL_PROGRESSION: NOT CHANGED
CLINICAL_PROGRESSION: NOT CHANGED

## 2019-03-30 ASSESSMENT — PAIN - FUNCTIONAL ASSESSMENT
PAIN_FUNCTIONAL_ASSESSMENT: PREVENTS OR INTERFERES SOME ACTIVE ACTIVITIES AND ADLS
PAIN_FUNCTIONAL_ASSESSMENT: ACTIVITIES ARE NOT PREVENTED
PAIN_FUNCTIONAL_ASSESSMENT: ACTIVITIES ARE NOT PREVENTED
PAIN_FUNCTIONAL_ASSESSMENT: PREVENTS OR INTERFERES SOME ACTIVE ACTIVITIES AND ADLS

## 2019-03-30 ASSESSMENT — PAIN DESCRIPTION - PAIN TYPE
TYPE: ACUTE PAIN

## 2019-03-30 ASSESSMENT — PAIN DESCRIPTION - DESCRIPTORS
DESCRIPTORS: CONSTANT;SHARP;SORE
DESCRIPTORS: ACHING;DISCOMFORT;HEADACHE
DESCRIPTORS: CONSTANT;HEADACHE;THROBBING
DESCRIPTORS: ACHING;CONSTANT
DESCRIPTORS: CONSTANT;SHARP;SORE
DESCRIPTORS: CONSTANT;SHARP;SORE
DESCRIPTORS: ACHING;CONSTANT

## 2019-03-30 ASSESSMENT — PAIN DESCRIPTION - FREQUENCY
FREQUENCY: INTERMITTENT
FREQUENCY: CONTINUOUS

## 2019-03-30 NOTE — PROGRESS NOTES
[facility]  Daily Trauma Progress Note  Attending      Admit Date: 3/26/2019    Hospital day 4    Other syncopal fall    INJURIES:   Patient Active Problem List   Diagnosis    Subdural hematoma (HCC)    Fracture of occipital bone of skull with loss of consciousness (HCC)    Hypokalemia    Electrolyte imbalance       PREVIOUS 24 HOUR EVENTS: BP meds added, no change in BP    Data Review  Data  CBC with Differential:    Lab Results   Component Value Date    WBC 5.6 03/30/2019    RBC 4.12 03/30/2019    HGB 13.0 03/30/2019    HCT 39.3 03/30/2019     03/30/2019    MCV 95.4 03/30/2019    MCH 31.6 03/30/2019    MCHC 33.1 03/30/2019    RDW 13.3 03/30/2019    LYMPHOPCT 18.8 03/30/2019    MONOPCT 14.8 03/30/2019    BASOPCT 0.4 03/30/2019    MONOSABS 0.83 03/30/2019    LYMPHSABS 1.05 03/30/2019    EOSABS 0.16 03/30/2019    BASOSABS 0.02 03/30/2019     CMP:    Lab Results   Component Value Date     03/30/2019    K 4.1 03/30/2019     03/30/2019    CO2 23 03/30/2019    BUN 11 03/30/2019    CREATININE 0.7 03/30/2019    GFRAA >60 03/30/2019    LABGLOM >60 03/30/2019    GLUCOSE 108 03/30/2019    PROT 7.2 03/29/2019    LABALBU 3.8 03/29/2019    CALCIUM 8.5 03/30/2019    BILITOT 0.5 03/29/2019    ALKPHOS 67 03/29/2019    AST 16 03/29/2019    ALT 9 03/29/2019     Radiology: reviewed    MEDICATIONS:   Scheduled Meds:   cloNIDine  0.2 mg Oral BID    [START ON 3/31/2019] pantoprazole  40 mg Oral QAM AC    losartan  25 mg Oral Daily    methocarbamol  1,000 mg Oral 4x Daily    enoxaparin  30 mg Subcutaneous BID    divalproex  500 mg Oral Daily    scopolamine  1 patch Transdermal Q72H    potassium bicarb-citric acid  40 mEq Oral Daily    levetiracetam  500 mg Intravenous BID    sodium chloride flush  10 mL Intravenous 2 times per day    nicotine  1 patch Transdermal Daily    thiamine  100 mg Intravenous Daily    folic acid  1 mg Intravenous Daily    acetaminophen  650 mg Oral Q4H Continuous Infusions:    PRN Meds:trimethobenzamide, morphine, hydrALAZINE, labetalol, sodium chloride flush, magnesium hydroxide, ondansetron, oxyCODONE, perflutren lipid microspheres    Subjective:     Nausea somewhat improved. Still with severe headache    Objective:     Patient Vitals for the past 8 hrs:   BP Temp Temp src Pulse Resp SpO2   03/30/19 0800 (!) 162/82 97.6 °F (36.4 °C) Tympanic 100 16 96 %   03/30/19 0440 (!) 180/82 -- -- -- -- 96 %     I/O last 3 completed shifts: In: 2852 [P.O.:1320; I.V.:1532]  Out: 1100 [Urine:1100]  I/O this shift: In: 500 [P.O.:400; IV Piggyback:100]  Out: 400 [Urine:400]    PHYSICAL:  General appearance:  Comfortable. Pain Description: severe--headache  GCS:    4 - Opens eyes on own   6 - Follows simple motor commands  5 - Alert and oriented  HEENT:  Eyes clear. PERRLA. Chest: Clear to ausculation bilaterally.     CV:  RRR    Abdomen:  SNTND +BS  Extremities:  PEREZ x 4  Skin:  Warm & dry  Vascular:peripheral pulses symmetrical    ASSESSMENT & PLAN:     System  Diagnostic & Treatment Plans   Respiratory     No active issues    Cardiovascular     HTN--Cozaar started, c/w PRNs, pain control, increase clonidine  Syncope--check orthostatics   Neurological     TBI--Keppra x 7 days; NSG following  Severe headache--control BP, add robaxin, will try imitrex if not resolving--will try today   Hematological/Coagulation     No active issues    Infectious Disease     No active issues    Renal/Fluids/Electrolytes/Acid base     Monitor sodium   Gastrointestinal     Diet as tolerated   Endocrine     No active issues    Musculoskeletal     No active issues    Pain Control/Sedation     C/w robaxin   PT/OT/Rehab     [facility] 11/24   VTE Prophylaxis     lovenox   GI prophylaxis     protonix   Comments/Other  Code status   full   Discharge Planning     Pending tolerating diet and pain control--patient wants to go to ARU in [place]  3/30/2019   12:10 PM    **I have discussed this patient with the resident and/or APN and I am in agreement with their assessment/plan except for the updates I have provided in this note.

## 2019-03-30 NOTE — PLAN OF CARE
Problem: Falls - Risk of:  Goal: Will remain free from falls  Description  Will remain free from falls  3/30/2019 0127 by Bindu Lee RN  Outcome: Met This Shift  3/29/2019 2354 by Cristina Shetty RN  Outcome: Met This Shift  3/29/2019 1424 by Nancy Kirby RN  Outcome: Met This Shift  Goal: Absence of physical injury  Description  Absence of physical injury  3/30/2019 0127 by Bindu Lee RN  Outcome: Met This Shift  3/29/2019 2354 by Cristina Shetty RN  Outcome: Met This Shift  3/29/2019 1424 by Nancy Kirby RN  Outcome: Met This Shift     Problem: Anxiety/Stress:  Goal: Level of anxiety will decrease  Description  Level of anxiety will decrease  3/29/2019 2354 by Cristina Shetty RN  Outcome: Met This Shift  3/29/2019 1424 by Nancy Kirby RN  Outcome: Met This Shift     Problem: Neurological  Goal: Maximum potential motor/sensory/cognitive function  3/29/2019 2354 by Cristina Shetty RN  Outcome: Met This Shift     Problem: Pain:  Goal: Pain level will decrease  Description  Pain level will decrease  3/30/2019 0127 by Bindu Lee RN  Outcome: Not Met This Shift  3/29/2019 2354 by Cristina Shetty RN  Outcome: Not Met This Shift  3/29/2019 1424 by Nancy Kirby RN  Outcome: Met This Shift  Goal: Control of acute pain  Description  Control of acute pain  3/29/2019 2354 by Cristina Shetty RN  Outcome: Not Met This Shift  3/29/2019 1424 by Nancy Kirby RN  Outcome: Met This Shift     Problem: Risk for Impaired Skin Integrity  Goal: Tissue integrity - skin and mucous membranes  Description  Structural intactness and normal physiological function of skin and  mucous membranes.   3/30/2019 0127 by Bindu Lee RN  Outcome: Not Met This Shift  3/29/2019 2354 by Cristina Shetty RN  Outcome: Met This Shift  3/29/2019 1424 by Nancy Kirby RN  Outcome: Met This Shift

## 2019-03-31 PROCEDURE — 6360000002 HC RX W HCPCS: Performed by: STUDENT IN AN ORGANIZED HEALTH CARE EDUCATION/TRAINING PROGRAM

## 2019-03-31 PROCEDURE — 6370000000 HC RX 637 (ALT 250 FOR IP): Performed by: STUDENT IN AN ORGANIZED HEALTH CARE EDUCATION/TRAINING PROGRAM

## 2019-03-31 PROCEDURE — 2580000003 HC RX 258: Performed by: STUDENT IN AN ORGANIZED HEALTH CARE EDUCATION/TRAINING PROGRAM

## 2019-03-31 PROCEDURE — 2060000000 HC ICU INTERMEDIATE R&B

## 2019-03-31 PROCEDURE — 6370000000 HC RX 637 (ALT 250 FOR IP): Performed by: NURSE PRACTITIONER

## 2019-03-31 PROCEDURE — 6360000002 HC RX W HCPCS: Performed by: SURGERY

## 2019-03-31 PROCEDURE — 6370000000 HC RX 637 (ALT 250 FOR IP): Performed by: NEUROLOGICAL SURGERY

## 2019-03-31 PROCEDURE — 2500000003 HC RX 250 WO HCPCS: Performed by: STUDENT IN AN ORGANIZED HEALTH CARE EDUCATION/TRAINING PROGRAM

## 2019-03-31 PROCEDURE — 99233 SBSQ HOSP IP/OBS HIGH 50: CPT | Performed by: SURGERY

## 2019-03-31 PROCEDURE — 6370000000 HC RX 637 (ALT 250 FOR IP): Performed by: SURGERY

## 2019-03-31 RX ORDER — BUTALBITAL, ACETAMINOPHEN AND CAFFEINE 50; 325; 40 MG/1; MG/1; MG/1
1 TABLET ORAL EVERY 6 HOURS
Status: DISCONTINUED | OUTPATIENT
Start: 2019-03-31 | End: 2019-04-02 | Stop reason: HOSPADM

## 2019-03-31 RX ORDER — ACETAMINOPHEN 325 MG/1
650 TABLET ORAL EVERY 6 HOURS PRN
Status: DISCONTINUED | OUTPATIENT
Start: 2019-03-31 | End: 2019-04-02 | Stop reason: HOSPADM

## 2019-03-31 RX ADMIN — METHOCARBAMOL TABLETS 1000 MG: 500 TABLET, COATED ORAL at 09:55

## 2019-03-31 RX ADMIN — BUTALBITAL, ACETAMINOPHEN, AND CAFFEINE 1 TABLET: 50; 325; 40 TABLET ORAL at 14:21

## 2019-03-31 RX ADMIN — Medication 10 ML: at 21:00

## 2019-03-31 RX ADMIN — CLONIDINE HYDROCHLORIDE 0.2 MG: 0.2 TABLET ORAL at 21:00

## 2019-03-31 RX ADMIN — Medication 10 ML: at 20:17

## 2019-03-31 RX ADMIN — MORPHINE SULFATE 2 MG: 2 INJECTION, SOLUTION INTRAMUSCULAR; INTRAVENOUS at 07:51

## 2019-03-31 RX ADMIN — MORPHINE SULFATE 2 MG: 2 INJECTION, SOLUTION INTRAMUSCULAR; INTRAVENOUS at 11:27

## 2019-03-31 RX ADMIN — LEVETIRACETAM 500 MG: 500 TABLET, FILM COATED ORAL at 21:00

## 2019-03-31 RX ADMIN — LEVETIRACETAM 500 MG: 500 TABLET, FILM COATED ORAL at 09:54

## 2019-03-31 RX ADMIN — LABETALOL HYDROCHLORIDE 10 MG: 5 INJECTION INTRAVENOUS at 06:34

## 2019-03-31 RX ADMIN — POTASSIUM BICARBONATE 40 MEQ: 782 TABLET, EFFERVESCENT ORAL at 09:55

## 2019-03-31 RX ADMIN — DIVALPROEX SODIUM 500 MG: 500 TABLET, EXTENDED RELEASE ORAL at 09:55

## 2019-03-31 RX ADMIN — METHOCARBAMOL TABLETS 1000 MG: 500 TABLET, COATED ORAL at 13:00

## 2019-03-31 RX ADMIN — THIAMINE HYDROCHLORIDE 100 MG: 100 INJECTION, SOLUTION INTRAMUSCULAR; INTRAVENOUS at 09:54

## 2019-03-31 RX ADMIN — METHOCARBAMOL TABLETS 1000 MG: 500 TABLET, COATED ORAL at 21:00

## 2019-03-31 RX ADMIN — PANTOPRAZOLE SODIUM 40 MG: 40 TABLET, DELAYED RELEASE ORAL at 06:33

## 2019-03-31 RX ADMIN — ENOXAPARIN SODIUM 30 MG: 30 INJECTION SUBCUTANEOUS at 21:04

## 2019-03-31 RX ADMIN — CLONIDINE HYDROCHLORIDE 0.2 MG: 0.2 TABLET ORAL at 09:55

## 2019-03-31 RX ADMIN — Medication 10 ML: at 11:28

## 2019-03-31 RX ADMIN — OXYCODONE HYDROCHLORIDE 5 MG: 5 TABLET ORAL at 06:33

## 2019-03-31 RX ADMIN — Medication 10 ML: at 07:52

## 2019-03-31 RX ADMIN — MORPHINE SULFATE 2 MG: 2 INJECTION, SOLUTION INTRAMUSCULAR; INTRAVENOUS at 20:17

## 2019-03-31 RX ADMIN — FOLIC ACID 1 MG: 5 INJECTION, SOLUTION INTRAMUSCULAR; INTRAVENOUS; SUBCUTANEOUS at 09:54

## 2019-03-31 RX ADMIN — MORPHINE SULFATE 2 MG: 2 INJECTION, SOLUTION INTRAMUSCULAR; INTRAVENOUS at 03:23

## 2019-03-31 RX ADMIN — BUTALBITAL, ACETAMINOPHEN, AND CAFFEINE 1 TABLET: 50; 325; 40 TABLET ORAL at 21:00

## 2019-03-31 RX ADMIN — ENOXAPARIN SODIUM 30 MG: 30 INJECTION SUBCUTANEOUS at 09:54

## 2019-03-31 RX ADMIN — ACETAMINOPHEN 650 MG: 325 TABLET, FILM COATED ORAL at 15:50

## 2019-03-31 RX ADMIN — LOSARTAN POTASSIUM 25 MG: 25 TABLET, FILM COATED ORAL at 09:55

## 2019-03-31 RX ADMIN — MORPHINE SULFATE 2 MG: 2 INJECTION, SOLUTION INTRAMUSCULAR; INTRAVENOUS at 15:25

## 2019-03-31 RX ADMIN — ACETAMINOPHEN 650 MG: 325 TABLET, FILM COATED ORAL at 22:05

## 2019-03-31 RX ADMIN — ACETAMINOPHEN 650 MG: 325 TABLET, FILM COATED ORAL at 03:22

## 2019-03-31 RX ADMIN — METHOCARBAMOL TABLETS 1000 MG: 500 TABLET, COATED ORAL at 18:00

## 2019-03-31 ASSESSMENT — PAIN DESCRIPTION - FREQUENCY
FREQUENCY: CONTINUOUS
FREQUENCY: CONTINUOUS
FREQUENCY: INTERMITTENT
FREQUENCY: INTERMITTENT
FREQUENCY: CONTINUOUS

## 2019-03-31 ASSESSMENT — PAIN DESCRIPTION - PAIN TYPE
TYPE: ACUTE PAIN

## 2019-03-31 ASSESSMENT — PAIN DESCRIPTION - LOCATION
LOCATION: HEAD

## 2019-03-31 ASSESSMENT — PAIN SCALES - GENERAL
PAINLEVEL_OUTOF10: 10
PAINLEVEL_OUTOF10: 9
PAINLEVEL_OUTOF10: 9
PAINLEVEL_OUTOF10: 0
PAINLEVEL_OUTOF10: 4
PAINLEVEL_OUTOF10: 6
PAINLEVEL_OUTOF10: 10
PAINLEVEL_OUTOF10: 10
PAINLEVEL_OUTOF10: 0
PAINLEVEL_OUTOF10: 9
PAINLEVEL_OUTOF10: 10
PAINLEVEL_OUTOF10: 7
PAINLEVEL_OUTOF10: 9
PAINLEVEL_OUTOF10: 10
PAINLEVEL_OUTOF10: 1
PAINLEVEL_OUTOF10: 7
PAINLEVEL_OUTOF10: 9

## 2019-03-31 ASSESSMENT — PAIN DESCRIPTION - DESCRIPTORS
DESCRIPTORS: ACHING
DESCRIPTORS: ACHING;CONSTANT
DESCRIPTORS: ACHING
DESCRIPTORS: ACHING;DISCOMFORT;THROBBING
DESCRIPTORS: ACHING
DESCRIPTORS: ACHING;CONSTANT
DESCRIPTORS: ACHING;DISCOMFORT;HEADACHE

## 2019-03-31 ASSESSMENT — PAIN - FUNCTIONAL ASSESSMENT
PAIN_FUNCTIONAL_ASSESSMENT: PREVENTS OR INTERFERES SOME ACTIVE ACTIVITIES AND ADLS

## 2019-03-31 ASSESSMENT — PAIN DESCRIPTION - ONSET
ONSET: PROGRESSIVE
ONSET: ON-GOING
ONSET: AWAKENED FROM SLEEP
ONSET: AWAKENED FROM SLEEP
ONSET: ON-GOING
ONSET: ON-GOING
ONSET: PROGRESSIVE

## 2019-03-31 ASSESSMENT — PAIN DESCRIPTION - PROGRESSION
CLINICAL_PROGRESSION: NOT CHANGED

## 2019-03-31 ASSESSMENT — PAIN DESCRIPTION - ORIENTATION: ORIENTATION: POSTERIOR;ANTERIOR

## 2019-03-31 NOTE — PROGRESS NOTES
Department of Neurosurgery  Progress Note    CHIEF COMPLAINT: SDH, dizziness    SUBJECTIVE:  Headache improved with depakote, neurologically stable. REVIEW OF SYSTEMS :  Constitutional: Negative for chills and fever. Neurological: Negative for dizziness, tremors and speech change. OBJECTIVE:   VITALS:  /80   Pulse 72   Temp 98 °F (36.7 °C) (Temporal)   Resp 12   Ht 5' 7\" (1.702 m)   Wt 230 lb (104.3 kg)   SpO2 93%   BMI 36.02 kg/m²     PHYSICAL:  Constitutional: He appears well-developed and well-nourished. HENT:   Head: Normocephalic. Eyes: Pupils are equal, round, and reactive to light. EOM are normal.   Neck: No tracheal deviation present. Cardiovascular: Normal rate. Pulmonary/Chest: Effort normal.   Abdominal: He exhibits no distension. Musculoskeletal: Normal range of motion. Neurological:    Alert   Face symmetric   No pronator drift   Motor strength full   Sensation intact to light touch    Skin: Skin is warm and dry.    Psychiatric: Thought content normal.            DATA:  CBC:   Lab Results   Component Value Date    WBC 5.6 03/30/2019    RBC 4.12 03/30/2019    HGB 13.0 03/30/2019    HCT 39.3 03/30/2019    MCV 95.4 03/30/2019    MCH 31.6 03/30/2019    MCHC 33.1 03/30/2019    RDW 13.3 03/30/2019     03/30/2019    MPV 10.0 03/30/2019     BMP:    Lab Results   Component Value Date     03/30/2019    K 4.1 03/30/2019     03/30/2019    CO2 23 03/30/2019    BUN 11 03/30/2019    LABALBU 3.8 03/29/2019    CREATININE 0.7 03/30/2019    CALCIUM 8.5 03/30/2019    GFRAA >60 03/30/2019    LABGLOM >60 03/30/2019    GLUCOSE 108 03/30/2019     PT/INR:    Lab Results   Component Value Date    PROTIME 11.4 03/26/2019    INR 1.0 03/26/2019     PTT:    Lab Results   Component Value Date    APTT 25.6 03/26/2019   [APTT}    Current Inpatient Medications  Current Facility-Administered Medications: cloNIDine (CATAPRES) tablet 0.2 mg, 0.2 mg, Oral, BID  [START ON 3/31/2019] pantoprazole (PROTONIX) tablet 40 mg, 40 mg, Oral, QAM AC  levETIRAcetam (KEPPRA) tablet 500 mg, 500 mg, Oral, BID  losartan (COZAAR) tablet 25 mg, 25 mg, Oral, Daily  methocarbamol (ROBAXIN) tablet 1,000 mg, 1,000 mg, Oral, 4x Daily  enoxaparin (LOVENOX) injection 30 mg, 30 mg, Subcutaneous, BID  divalproex (DEPAKOTE ER) extended release tablet 500 mg, 500 mg, Oral, Daily  scopolamine (TRANSDERM-SCOP) transdermal patch 1 patch, 1 patch, Transdermal, Q72H  potassium bicarb-citric acid (EFFER-K) effervescent tablet 40 mEq, 40 mEq, Oral, Daily  trimethobenzamide (TIGAN) injection 200 mg, 200 mg, Intramuscular, Q6H PRN  morphine (PF) injection 2 mg, 2 mg, Intravenous, Q4H PRN  hydrALAZINE (APRESOLINE) injection 10 mg, 10 mg, Intravenous, Q30 Min PRN  labetalol (NORMODYNE;TRANDATE) injection 10 mg, 10 mg, Intravenous, Q30 Min PRN  sodium chloride flush 0.9 % injection 10 mL, 10 mL, Intravenous, 2 times per day  sodium chloride flush 0.9 % injection 10 mL, 10 mL, Intravenous, PRN  magnesium hydroxide (MILK OF MAGNESIA) 400 MG/5ML suspension 30 mL, 30 mL, Oral, Daily PRN  ondansetron (ZOFRAN) injection 4 mg, 4 mg, Intravenous, Q6H PRN  nicotine (NICODERM CQ) 21 MG/24HR 1 patch, 1 patch, Transdermal, Daily  thiamine (B-1) injection 100 mg, 100 mg, Intravenous, Daily  folic acid injection 1 mg, 1 mg, Intravenous, Daily  acetaminophen (TYLENOL) tablet 650 mg, 650 mg, Oral, Q4H  oxyCODONE (ROXICODONE) immediate release tablet 5 mg, 5 mg, Oral, Q4H PRN  perflutren lipid microspheres (DEFINITY) injection 1.65 mg, 1.5 mL, Intravenous, ONCE PRN    ASSESSMENT:   New problem: right frontal-temporal SDH s/p fall     Papi Calvert is 66years old. He presented with a syncopal episode. He was found passed out in his truck. He hit the back of his head. He suffered an occipital bone fracture. He has a right frontotemporal subdural hematoma measuring 8 mm in thickness. There is 7 mm of midline shift. The basal cisterns are patent.   He is currently neurologically intact. No current surgical intervention is planned. 3/26 HCT: right frontal and temporal SDH measuring 8mm thick with local sulcal effacement and 7mm midline shift. The basal cisterns are patent. Non-depressed occipital bone fx.     3/26 CT C spine- no fx    He is currently asymptomatic from the subdural hemorrhage. 3/26 Repeat HCT: stable. PLAN:  -No surgical intervention needed if neurological exam remains without any deficits. -SBP goal < 140.   -Avoid hyponatremia  -Serial neurological exams  -Follow up in neurosurgery clinic in 1 month with repeat head CT. He is from [place] and plans to follow up with a local Neurosurgeon.   -keppra 500mg bid for 1 week. -depakote ER ordered for 2 weeks for headache.      Electronically signed by Ary Early MD on 3/30/2019 at 8:40 PM

## 2019-03-31 NOTE — PROGRESS NOTES
[facility]  Daily Trauma Progress Note  Attending      Admit Date: 3/26/2019    Hospital day 5    Other syncopal fall    INJURIES:   Patient Active Problem List   Diagnosis    Subdural hematoma ([address]    Fracture of occipital bone of skull with loss of consciousness (HCC)    Hypokalemia    Electrolyte imbalance       PREVIOUS 24 HOUR EVENTS: BP better controlled, less nausea, still with headache    Data Review  Data  CBC with Differential:    Lab Results   Component Value Date    WBC 5.6 03/30/2019    RBC 4.12 03/30/2019    HGB 13.0 03/30/2019    HCT 39.3 03/30/2019     03/30/2019    MCV 95.4 03/30/2019    MCH 31.6 03/30/2019    MCHC 33.1 03/30/2019    RDW 13.3 03/30/2019    LYMPHOPCT 18.8 03/30/2019    MONOPCT 14.8 03/30/2019    BASOPCT 0.4 03/30/2019    MONOSABS 0.83 03/30/2019    LYMPHSABS 1.05 03/30/2019    EOSABS 0.16 03/30/2019    BASOSABS 0.02 03/30/2019     CMP:    Lab Results   Component Value Date     03/30/2019    K 4.1 03/30/2019     03/30/2019    CO2 23 03/30/2019    BUN 11 03/30/2019    CREATININE 0.7 03/30/2019    GFRAA >60 03/30/2019    LABGLOM >60 03/30/2019    GLUCOSE 108 03/30/2019    PROT 7.2 03/29/2019    LABALBU 3.8 03/29/2019    CALCIUM 8.5 03/30/2019    BILITOT 0.5 03/29/2019    ALKPHOS 67 03/29/2019    AST 16 03/29/2019    ALT 9 03/29/2019     Radiology: reviewed    MEDICATIONS:   Scheduled Meds:   cloNIDine  0.2 mg Oral BID    pantoprazole  40 mg Oral QAM AC    levETIRAcetam  500 mg Oral BID    losartan  25 mg Oral Daily    methocarbamol  1,000 mg Oral 4x Daily    enoxaparin  30 mg Subcutaneous BID    divalproex  500 mg Oral Daily    scopolamine  1 patch Transdermal Q72H    potassium bicarb-citric acid  40 mEq Oral Daily    sodium chloride flush  10 mL Intravenous 2 times per day    nicotine  1 patch Transdermal Daily    thiamine  100 mg Intravenous Daily    folic acid  1 mg Intravenous Daily    acetaminophen  650 mg Oral Q4H

## 2019-04-01 LAB
ANION GAP SERPL CALCULATED.3IONS-SCNC: 13 MMOL/L (ref 7–16)
BASOPHILS ABSOLUTE: 0.03 E9/L (ref 0–0.2)
BASOPHILS RELATIVE PERCENT: 0.6 % (ref 0–2)
BUN BLDV-MCNC: 17 MG/DL (ref 8–23)
CALCIUM SERPL-MCNC: 8.7 MG/DL (ref 8.6–10.2)
CHLORIDE BLD-SCNC: 102 MMOL/L (ref 98–107)
CO2: 25 MMOL/L (ref 22–29)
CREAT SERPL-MCNC: 0.8 MG/DL (ref 0.7–1.2)
EOSINOPHILS ABSOLUTE: 0.2 E9/L (ref 0.05–0.5)
EOSINOPHILS RELATIVE PERCENT: 3.9 % (ref 0–6)
GFR AFRICAN AMERICAN: >60
GFR NON-AFRICAN AMERICAN: >60 ML/MIN/1.73
GLUCOSE BLD-MCNC: 125 MG/DL (ref 74–99)
HCT VFR BLD CALC: 39 % (ref 37–54)
HEMOGLOBIN: 12.8 G/DL (ref 12.5–16.5)
IMMATURE GRANULOCYTES #: 0.02 E9/L
IMMATURE GRANULOCYTES %: 0.4 % (ref 0–5)
LYMPHOCYTES ABSOLUTE: 0.77 E9/L (ref 1.5–4)
LYMPHOCYTES RELATIVE PERCENT: 15.2 % (ref 20–42)
MCH RBC QN AUTO: 31.4 PG (ref 26–35)
MCHC RBC AUTO-ENTMCNC: 32.8 % (ref 32–34.5)
MCV RBC AUTO: 95.6 FL (ref 80–99.9)
MONOCYTES ABSOLUTE: 0.6 E9/L (ref 0.1–0.95)
MONOCYTES RELATIVE PERCENT: 11.8 % (ref 2–12)
NEUTROPHILS ABSOLUTE: 3.45 E9/L (ref 1.8–7.3)
NEUTROPHILS RELATIVE PERCENT: 68.1 % (ref 43–80)
PDW BLD-RTO: 12.9 FL (ref 11.5–15)
PLATELET # BLD: 246 E9/L (ref 130–450)
PMV BLD AUTO: 10.3 FL (ref 7–12)
POTASSIUM REFLEX MAGNESIUM: 4.1 MMOL/L (ref 3.5–5)
RBC # BLD: 4.08 E12/L (ref 3.8–5.8)
SODIUM BLD-SCNC: 140 MMOL/L (ref 132–146)
WBC # BLD: 5.1 E9/L (ref 4.5–11.5)

## 2019-04-01 PROCEDURE — 97530 THERAPEUTIC ACTIVITIES: CPT

## 2019-04-01 PROCEDURE — 6360000002 HC RX W HCPCS: Performed by: STUDENT IN AN ORGANIZED HEALTH CARE EDUCATION/TRAINING PROGRAM

## 2019-04-01 PROCEDURE — 2060000000 HC ICU INTERMEDIATE R&B

## 2019-04-01 PROCEDURE — 6370000000 HC RX 637 (ALT 250 FOR IP): Performed by: NURSE PRACTITIONER

## 2019-04-01 PROCEDURE — 99232 SBSQ HOSP IP/OBS MODERATE 35: CPT | Performed by: NEUROLOGICAL SURGERY

## 2019-04-01 PROCEDURE — 6370000000 HC RX 637 (ALT 250 FOR IP): Performed by: STUDENT IN AN ORGANIZED HEALTH CARE EDUCATION/TRAINING PROGRAM

## 2019-04-01 PROCEDURE — 6370000000 HC RX 637 (ALT 250 FOR IP): Performed by: NEUROLOGICAL SURGERY

## 2019-04-01 PROCEDURE — 2500000003 HC RX 250 WO HCPCS: Performed by: STUDENT IN AN ORGANIZED HEALTH CARE EDUCATION/TRAINING PROGRAM

## 2019-04-01 PROCEDURE — 99232 SBSQ HOSP IP/OBS MODERATE 35: CPT | Performed by: SURGERY

## 2019-04-01 PROCEDURE — 6360000002 HC RX W HCPCS: Performed by: SURGERY

## 2019-04-01 PROCEDURE — 85025 COMPLETE CBC W/AUTO DIFF WBC: CPT

## 2019-04-01 PROCEDURE — 36415 COLL VENOUS BLD VENIPUNCTURE: CPT

## 2019-04-01 PROCEDURE — 80048 BASIC METABOLIC PNL TOTAL CA: CPT

## 2019-04-01 PROCEDURE — 6370000000 HC RX 637 (ALT 250 FOR IP): Performed by: SURGERY

## 2019-04-01 PROCEDURE — 2580000003 HC RX 258: Performed by: STUDENT IN AN ORGANIZED HEALTH CARE EDUCATION/TRAINING PROGRAM

## 2019-04-01 PROCEDURE — 97535 SELF CARE MNGMENT TRAINING: CPT

## 2019-04-01 RX ORDER — SCOLOPAMINE TRANSDERMAL SYSTEM 1 MG/1
1 PATCH, EXTENDED RELEASE TRANSDERMAL
Qty: 3 PATCH | Refills: 0 | Status: SHIPPED | OUTPATIENT
Start: 2019-04-03 | End: 2019-04-10

## 2019-04-01 RX ORDER — METHOCARBAMOL 500 MG/1
1000 TABLET, FILM COATED ORAL 4 TIMES DAILY
Qty: 80 TABLET | Refills: 0 | Status: SHIPPED | OUTPATIENT
Start: 2019-04-01 | End: 2019-04-11

## 2019-04-01 RX ORDER — LOSARTAN POTASSIUM 25 MG/1
25 TABLET ORAL DAILY
Qty: 7 TABLET | Refills: 0 | Status: SHIPPED | OUTPATIENT
Start: 2019-04-02 | End: 2019-04-09

## 2019-04-01 RX ORDER — BUTALBITAL, ACETAMINOPHEN AND CAFFEINE 50; 325; 40 MG/1; MG/1; MG/1
1 TABLET ORAL EVERY 6 HOURS
Qty: 26 TABLET | Refills: 0 | Status: SHIPPED | OUTPATIENT
Start: 2019-04-01 | End: 2019-04-08

## 2019-04-01 RX ORDER — CLONIDINE HYDROCHLORIDE 0.2 MG/1
0.2 TABLET ORAL 2 TIMES DAILY
Qty: 14 TABLET | Refills: 0 | Status: SHIPPED | OUTPATIENT
Start: 2019-04-01 | End: 2019-04-08

## 2019-04-01 RX ORDER — DIVALPROEX SODIUM 500 MG/1
500 TABLET, EXTENDED RELEASE ORAL DAILY
Qty: 7 TABLET | Refills: 0 | Status: SHIPPED | OUTPATIENT
Start: 2019-04-02 | End: 2019-04-09

## 2019-04-01 RX ORDER — IBUPROFEN 800 MG/1
800 TABLET ORAL 2 TIMES DAILY PRN
Qty: 180 TABLET | Refills: 1 | Status: SHIPPED | OUTPATIENT
Start: 2019-04-01

## 2019-04-01 RX ORDER — LEVETIRACETAM 500 MG/1
500 TABLET ORAL 2 TIMES DAILY
Qty: 60 TABLET | Refills: 0 | Status: SHIPPED | OUTPATIENT
Start: 2019-04-01 | End: 2019-04-07

## 2019-04-01 RX ADMIN — Medication 10 ML: at 12:29

## 2019-04-01 RX ADMIN — Medication 10 ML: at 08:29

## 2019-04-01 RX ADMIN — METHOCARBAMOL TABLETS 1000 MG: 500 TABLET, COATED ORAL at 20:41

## 2019-04-01 RX ADMIN — METHOCARBAMOL TABLETS 1000 MG: 500 TABLET, COATED ORAL at 17:14

## 2019-04-01 RX ADMIN — LOSARTAN POTASSIUM 25 MG: 25 TABLET, FILM COATED ORAL at 09:32

## 2019-04-01 RX ADMIN — LEVETIRACETAM 500 MG: 500 TABLET, FILM COATED ORAL at 20:43

## 2019-04-01 RX ADMIN — ACETAMINOPHEN 650 MG: 325 TABLET, FILM COATED ORAL at 21:20

## 2019-04-01 RX ADMIN — BUTALBITAL, ACETAMINOPHEN, AND CAFFEINE 1 TABLET: 50; 325; 40 TABLET ORAL at 19:26

## 2019-04-01 RX ADMIN — CLONIDINE HYDROCHLORIDE 0.2 MG: 0.2 TABLET ORAL at 09:32

## 2019-04-01 RX ADMIN — PANTOPRAZOLE SODIUM 40 MG: 40 TABLET, DELAYED RELEASE ORAL at 07:41

## 2019-04-01 RX ADMIN — METHOCARBAMOL TABLETS 1000 MG: 500 TABLET, COATED ORAL at 12:28

## 2019-04-01 RX ADMIN — Medication 10 ML: at 00:31

## 2019-04-01 RX ADMIN — METHOCARBAMOL TABLETS 1000 MG: 500 TABLET, COATED ORAL at 09:33

## 2019-04-01 RX ADMIN — OXYCODONE HYDROCHLORIDE 5 MG: 5 TABLET ORAL at 20:07

## 2019-04-01 RX ADMIN — MORPHINE SULFATE 2 MG: 2 INJECTION, SOLUTION INTRAMUSCULAR; INTRAVENOUS at 08:31

## 2019-04-01 RX ADMIN — ENOXAPARIN SODIUM 30 MG: 30 INJECTION SUBCUTANEOUS at 09:32

## 2019-04-01 RX ADMIN — LEVETIRACETAM 500 MG: 500 TABLET, FILM COATED ORAL at 09:32

## 2019-04-01 RX ADMIN — BUTALBITAL, ACETAMINOPHEN, AND CAFFEINE 1 TABLET: 50; 325; 40 TABLET ORAL at 12:30

## 2019-04-01 RX ADMIN — ACETAMINOPHEN 650 MG: 325 TABLET, FILM COATED ORAL at 04:31

## 2019-04-01 RX ADMIN — THIAMINE HYDROCHLORIDE 100 MG: 100 INJECTION, SOLUTION INTRAMUSCULAR; INTRAVENOUS at 09:33

## 2019-04-01 RX ADMIN — CLONIDINE HYDROCHLORIDE 0.2 MG: 0.2 TABLET ORAL at 20:41

## 2019-04-01 RX ADMIN — BUTALBITAL, ACETAMINOPHEN, AND CAFFEINE 1 TABLET: 50; 325; 40 TABLET ORAL at 00:31

## 2019-04-01 RX ADMIN — DIVALPROEX SODIUM 500 MG: 500 TABLET, EXTENDED RELEASE ORAL at 09:32

## 2019-04-01 RX ADMIN — MORPHINE SULFATE 2 MG: 2 INJECTION, SOLUTION INTRAMUSCULAR; INTRAVENOUS at 00:31

## 2019-04-01 RX ADMIN — FOLIC ACID 1 MG: 5 INJECTION, SOLUTION INTRAMUSCULAR; INTRAVENOUS; SUBCUTANEOUS at 09:33

## 2019-04-01 RX ADMIN — ENOXAPARIN SODIUM 30 MG: 30 INJECTION SUBCUTANEOUS at 20:41

## 2019-04-01 RX ADMIN — MORPHINE SULFATE 2 MG: 2 INJECTION, SOLUTION INTRAMUSCULAR; INTRAVENOUS at 04:31

## 2019-04-01 RX ADMIN — POTASSIUM BICARBONATE 40 MEQ: 782 TABLET, EFFERVESCENT ORAL at 09:32

## 2019-04-01 RX ADMIN — MORPHINE SULFATE 2 MG: 2 INJECTION, SOLUTION INTRAMUSCULAR; INTRAVENOUS at 12:30

## 2019-04-01 RX ADMIN — BUTALBITAL, ACETAMINOPHEN, AND CAFFEINE 1 TABLET: 50; 325; 40 TABLET ORAL at 07:41

## 2019-04-01 ASSESSMENT — PAIN DESCRIPTION - LOCATION
LOCATION: HEAD

## 2019-04-01 ASSESSMENT — PAIN DESCRIPTION - ONSET
ONSET: ON-GOING
ONSET: AWAKENED FROM SLEEP
ONSET: ON-GOING

## 2019-04-01 ASSESSMENT — PAIN DESCRIPTION - PROGRESSION
CLINICAL_PROGRESSION: NOT CHANGED

## 2019-04-01 ASSESSMENT — PAIN SCALES - GENERAL
PAINLEVEL_OUTOF10: 0
PAINLEVEL_OUTOF10: 10
PAINLEVEL_OUTOF10: 10
PAINLEVEL_OUTOF10: 9
PAINLEVEL_OUTOF10: 10
PAINLEVEL_OUTOF10: 10
PAINLEVEL_OUTOF10: 0
PAINLEVEL_OUTOF10: 10
PAINLEVEL_OUTOF10: 5
PAINLEVEL_OUTOF10: 5
PAINLEVEL_OUTOF10: 10
PAINLEVEL_OUTOF10: 9

## 2019-04-01 ASSESSMENT — PAIN DESCRIPTION - ORIENTATION
ORIENTATION: ANTERIOR;POSTERIOR
ORIENTATION: ANTERIOR;POSTERIOR
ORIENTATION: RIGHT
ORIENTATION: ANTERIOR;POSTERIOR
ORIENTATION: RIGHT
ORIENTATION: ANTERIOR;POSTERIOR

## 2019-04-01 ASSESSMENT — PAIN DESCRIPTION - DESCRIPTORS
DESCRIPTORS: ACHING;DISCOMFORT;HEADACHE
DESCRIPTORS: ACHING;CONSTANT
DESCRIPTORS: ACHING
DESCRIPTORS: ACHING;CONSTANT

## 2019-04-01 ASSESSMENT — PAIN DESCRIPTION - FREQUENCY
FREQUENCY: CONTINUOUS
FREQUENCY: INTERMITTENT
FREQUENCY: CONTINUOUS

## 2019-04-01 ASSESSMENT — PAIN DESCRIPTION - PAIN TYPE
TYPE: ACUTE PAIN

## 2019-04-01 ASSESSMENT — PAIN - FUNCTIONAL ASSESSMENT
PAIN_FUNCTIONAL_ASSESSMENT: PREVENTS OR INTERFERES SOME ACTIVE ACTIVITIES AND ADLS
PAIN_FUNCTIONAL_ASSESSMENT: PREVENTS OR INTERFERES SOME ACTIVE ACTIVITIES AND ADLS
PAIN_FUNCTIONAL_ASSESSMENT: ACTIVITIES ARE NOT PREVENTED
PAIN_FUNCTIONAL_ASSESSMENT: ACTIVITIES ARE NOT PREVENTED
PAIN_FUNCTIONAL_ASSESSMENT: PREVENTS OR INTERFERES SOME ACTIVE ACTIVITIES AND ADLS

## 2019-04-01 NOTE — CARE COORDINATION
Met with Nicole Thomas, liaison with [employer] for patient's workman's Comp case. Reviewed update from over the weekend and current complaints. Await updated therapy notes and will fax them to [phone]. Claim Information as provided below for billing per Nicole Thomas:    Claim Number:  [id number]  Self Insured  [address]  P:  [phone]  Contact: Solange Mc.  for Moisés Dempsey for Pharmacy is:   Aura Welsh RN., [facility]  C: [phone]  F: [phone]  TF: [phone]  [address] [facility]  [address] [place] [address]  Email:  [email or web address]    Diagnosis:  Subdural Hematoma from Fall,   Occiput Fracture from Fall. Updated information emailed to verification team and UR. Will continue to follow.      Garret Lee.

## 2019-04-01 NOTE — PROGRESS NOTES
Department of Neurosurgery  Progress Note    CHIEF COMPLAINT: SDH, dizziness    SUBJECTIVE:  C/o headache. Neurological exam stable. No new issues overnight. REVIEW OF SYSTEMS :  Constitutional: Negative for chills and fever. Neurological: Negative for dizziness, tremors and speech change. OBJECTIVE:   VITALS:  /74   Pulse 58   Temp 97.7 °F (36.5 °C) (Temporal)   Resp 16   Ht 5' 7\" (1.702 m)   Wt 230 lb (104.3 kg)   SpO2 93%   BMI 36.02 kg/m²     PHYSICAL:  Constitutional: He appears well-developed and well-nourished. HENT:   Head: Normocephalic. Eyes: Pupils are equal, round, and reactive to light. EOM are normal.   Neck: No tracheal deviation present. Cardiovascular: Normal rate. Pulmonary/Chest: Effort normal.   Abdominal: He exhibits no distension. Musculoskeletal: Normal range of motion. Neurological:    Alert   Face symmetric   No pronator drift   Motor strength full   Sensation intact to light touch    Skin: Skin is warm and dry.    Psychiatric: Thought content normal.            DATA:  CBC:   Lab Results   Component Value Date    WBC 5.6 03/30/2019    RBC 4.12 03/30/2019    HGB 13.0 03/30/2019    HCT 39.3 03/30/2019    MCV 95.4 03/30/2019    MCH 31.6 03/30/2019    MCHC 33.1 03/30/2019    RDW 13.3 03/30/2019     03/30/2019    MPV 10.0 03/30/2019     BMP:    Lab Results   Component Value Date     03/30/2019    K 4.1 03/30/2019     03/30/2019    CO2 23 03/30/2019    BUN 11 03/30/2019    LABALBU 3.8 03/29/2019    CREATININE 0.7 03/30/2019    CALCIUM 8.5 03/30/2019    GFRAA >60 03/30/2019    LABGLOM >60 03/30/2019    GLUCOSE 108 03/30/2019     PT/INR:    Lab Results   Component Value Date    PROTIME 11.4 03/26/2019    INR 1.0 03/26/2019     PTT:    Lab Results   Component Value Date    APTT 25.6 03/26/2019   [APTT}    Current Inpatient Medications  Current Facility-Administered Medications: butalbital-acetaminophen-caffeine (FIORICET, ESGIC) per tablet 1 tablet, 1 tablet, Oral, Q6H  acetaminophen (TYLENOL) tablet 650 mg, 650 mg, Oral, Q6H PRN  cloNIDine (CATAPRES) tablet 0.2 mg, 0.2 mg, Oral, BID  pantoprazole (PROTONIX) tablet 40 mg, 40 mg, Oral, QAM AC  levETIRAcetam (KEPPRA) tablet 500 mg, 500 mg, Oral, BID  losartan (COZAAR) tablet 25 mg, 25 mg, Oral, Daily  methocarbamol (ROBAXIN) tablet 1,000 mg, 1,000 mg, Oral, 4x Daily  enoxaparin (LOVENOX) injection 30 mg, 30 mg, Subcutaneous, BID  divalproex (DEPAKOTE ER) extended release tablet 500 mg, 500 mg, Oral, Daily  scopolamine (TRANSDERM-SCOP) transdermal patch 1 patch, 1 patch, Transdermal, Q72H  potassium bicarb-citric acid (EFFER-K) effervescent tablet 40 mEq, 40 mEq, Oral, Daily  trimethobenzamide (TIGAN) injection 200 mg, 200 mg, Intramuscular, Q6H PRN  morphine (PF) injection 2 mg, 2 mg, Intravenous, Q4H PRN  hydrALAZINE (APRESOLINE) injection 10 mg, 10 mg, Intravenous, Q30 Min PRN  labetalol (NORMODYNE;TRANDATE) injection 10 mg, 10 mg, Intravenous, Q30 Min PRN  sodium chloride flush 0.9 % injection 10 mL, 10 mL, Intravenous, 2 times per day  sodium chloride flush 0.9 % injection 10 mL, 10 mL, Intravenous, PRN  magnesium hydroxide (MILK OF MAGNESIA) 400 MG/5ML suspension 30 mL, 30 mL, Oral, Daily PRN  ondansetron (ZOFRAN) injection 4 mg, 4 mg, Intravenous, Q6H PRN  nicotine (NICODERM CQ) 21 MG/24HR 1 patch, 1 patch, Transdermal, Daily  thiamine (B-1) injection 100 mg, 100 mg, Intravenous, Daily  folic acid injection 1 mg, 1 mg, Intravenous, Daily  oxyCODONE (ROXICODONE) immediate release tablet 5 mg, 5 mg, Oral, Q4H PRN  perflutren lipid microspheres (DEFINITY) injection 1.65 mg, 1.5 mL, Intravenous, ONCE PRN    ASSESSMENT:   New problem: right frontal-temporal SDH s/p fall     Taylor Seat is 66years old. He presented with a syncopal episode. He was found passed out in his truck. He hit the back of his head. He suffered an occipital bone fracture.   He has a right frontotemporal subdural hematoma measuring 8 mm in thickness. There is 7 mm of midline shift. The basal cisterns are patent. He is currently neurologically intact. No current surgical intervention is planned. 3/26 HCT: right frontal and temporal SDH measuring 8mm thick with local sulcal effacement and 7mm midline shift. The basal cisterns are patent. Non-depressed occipital bone fx.     3/26 CT C spine- no fx    He is currently asymptomatic from the subdural hemorrhage. 3/26 Repeat HCT: stable. PLAN:  -No surgical intervention needed if neurological exam remains without any deficits. -SBP goal < 140.   -Avoid hyponatremia  -Serial neurological exams  -Follow up in neurosurgery clinic in 1 month with repeat head CT. He is from [place] and plans to follow up with a local Neurosurgeon.   -keppra 500mg bid for 1 week. -depakote ER ordered for 2 weeks for headache. Electronically signed by Lola Woods PA-C on 4/1/2019 at 9:13 AM       I independently saw, evaluated, and examined the patient. Agree with plan outlined above.    Electronically signed by Katherine Park MD on 4/1/2019 at 9:33 PM

## 2019-04-01 NOTE — PROGRESS NOTES
OT BEDSIDE TREATMENT NOTE      Date:2019  Patient Name: Massiel Schmidt  MRN: [id number]  : 1955  Room: [address] [place] [address]     Evaluating OT: Charles Whelan OTR/L     AM-PAC Daily Activity Raw Score:    Recommended Adaptive Equipment:  w/w, tub bench     Comments: Based on patient's functional performance as stated above and level of assistance needed prior to admission, this therapist believes that the patient would benefit from continued skilled OT during/following hospital stay in an effort to increase safety, functional independence, and quality of life.        Diagnosis: SDH (R frontal/temporal with midline shift)  Reason for admission: Pt with syncopal episode and fall at work. Pt found to have occipital bone fracture and SDH  Pertinent Medical History: None  Precautions:  Falls, SBP goal <140, occipital bone fracture,     Per OT Eval:   Home Living: Pt is from [place], [place]. Pt lives with fiance in a 1 story home with 2 step(s) to enter and 1 rail(s); bed/bath on 1st floor. Laundry located in basement however pt does not do.   Bathroom setup: Pt using tub/ shower; standard toilet  Equipment owned: none     Prior Level of Function: Indep with ADLs; Indep with IADLs; using no device  for ambulation. Driving: yes  Occupation:     Pain: No c/o pain this date. Pt c/o dizziness with all activity. Cognition: WNL. Follows 2 step commands. Pleasant and cooperative.               Memory: Fair+              Sequencing: Fair+              Problem solving: Fair+              Judgement/safety: Fair+  Functional Assessment:    Treatment Status:  19 Re-eval Status  Date: 3/28/19 Short Term Goals  Treatment frequency: 1-3x/week on ICU; PRN on stepdown unit  -pt will improve. .. Feeding Independent  Seated at EOB.   Min A  cup     Indep  Sitting upright in chair for majority of meals   Grooming SBA  Pt demo'd ability to wash hands/face at sink with SBA for balance/safety.   SBA  Increased  NT    Dynamic:NT  demo Indep dynamic sitting balance EOB during ADL tasks; Mod I dynamic standing balance during ADL tasks using stable surface prn   Endurance/  Activity Tolerance Fair+ P activity tolerance/endurance during light ADL task; tolerated sitting EOB ~3 minutes severe nausea/dizziness. See comments   demo G activity tolerance/endurance during [id number] min ADL task    Visual/  Perceptual Pt c/o slight dizziness throughout treatment.      Glasses: None present. Pt reports constant dizziness; Able to tell # of fingers in front.     Noted to have difficulty keeping L eye open during visual scanning exam which was [facility]     Safety Fair  Min cues for safety and insight required throughout treatment.   P focus/attn due to medical status/dizziness G safety noted during ADL routine and functional mobility; minimal to no VC required for judgment, problem solving, safety awareness       Comments: Upon arrival pt in supine with HOB elevated and family present. Pt educated on transfer/mobility safety, adaptive techniques for ADL tasks, recommended DME, shower safety, fall prevention, walker safety, insight and benefits of increasing activity. Pt verbalized/demonstrated fair understanding, recommend continued education. At end of session pt left seated at EOB with family and HCA present,  call light within reach. · Pt has made good progress towards set goals.    · Continue with current plan of care    Time in: 1107  Time out:1130  Total Tx Time: 21 Minutes    Mannie NEGRETE/SEAMUS [id number]

## 2019-04-01 NOTE — CARE COORDINATION
Spoke with the patient and significant other Vu Wahl at the bedside. The patient and his significant other would like for the patient to transition to [place] [address] in [place] where they live. Patient and family would like 1.) [employer] or 2.) [employer]. Patient stated he plans to get in the care with Vu Wahl and go home. Explained that per the therapy evaluations last week, the patient is requiring a lot of assistance to stand and transfer. Explained that private car, based on those evaluations, is not a safe plan for an eight hour or greater road trip. Patient also stating to nursing that he continues to become very dizzy with a horrible headache when he stands up. Patient expressed understanding. Explained that therapy would be evaluating the patient again today. Per Adriana Franklin CM with [employer] comp will pay for transport back to [place] if necessary. Will confirm with Adriana Franklin. Call placed to [facility], liaison with [employer], [phone] and referral made. Clinical information faxed to [phone] at her request.  Will continue to follow for further transition of care planning needs.      Jimi Banegas.

## 2019-04-01 NOTE — PROGRESS NOTES
impulsively. Gait is slow and reciprocating , occasional cuing to avoid environmental obstacles. No LOB. Cued for approximation to AD w/ directional changes, feet were outside JUDITH of the AD. Pt easily distracted by environment  . Cuing to remain focused on task. Returned to sit EOB , wife present. Educated pt and wife on car transfer technique. Both reported understanding . Pt was left EOB  with call light left by patient. Chair/bed alarm: NA     Time in: 1107  Time out: 1130      Continue with physical therapy current plan of care.     Atlee Hiss PTA [id number]

## 2019-04-01 NOTE — CARE COORDINATION
Spoke with Chloe Escalante with [employer] re: transition of care plans. Per Chloe Escalante, while she did state that workman's comp will pay for the transition transportation, she now states that if the patient can be managed for rehab in [facility] prior to returning to [place], [employer] comp may not pay for the patient to transfer for short term rehab. She is on her way up to the hospital now to speak with the patient and his family. Will continue to follow.      Scott Briceno.

## 2019-04-01 NOTE — PROGRESS NOTES
Patient requesting to take shower, informed by this nurse that physician needs to place order. Patient alert and oriented.

## 2019-04-01 NOTE — CARE COORDINATION
Message sent to Anaya Hendrickson team re: updated therapy evaluations and discussion with liaison for [employer]:   Therapy much improved, he walking 175 ft without assist with a wheel walker and is not appropriate for rehab at this time, [facility] or Acute. Patient is appropriate to go home with [facility] Vieira. He will need a C-9 signed for a wheeled walker, it's up here in his paper light chart. He will need home health care orders placed for PT/OT and Nursing for Vitals, CPA, and med compliance. I spoke with Shawna Olson, the case manger for [employer]. They will help him arrange [facility]ie[facility] provider near his home after discharge and can be ready for him to discharge to home tomorrow if he is medically stable. [facility] as his pharmacy for the prescriptions to be sent to. We will need printed prescriptions for pain medications. [facility] will require the claim number, billing info, injury date and injury, and Contact Information for Shawna Olson prior to running prescriptions once they are written and patient is discharged. Will follow-up with them. Will continue to follow for further transition of care planning needs.      Jonnathan Nielsen.

## 2019-04-02 VITALS
RESPIRATION RATE: 16 BRPM | BODY MASS INDEX: 36.1 KG/M2 | HEART RATE: 66 BPM | TEMPERATURE: 97.6 F | SYSTOLIC BLOOD PRESSURE: 130 MMHG | DIASTOLIC BLOOD PRESSURE: 80 MMHG | WEIGHT: 230 LBS | OXYGEN SATURATION: 97 % | HEIGHT: 67 IN

## 2019-04-02 LAB
ANION GAP SERPL CALCULATED.3IONS-SCNC: 13 MMOL/L (ref 7–16)
BASOPHILS ABSOLUTE: 0.04 E9/L (ref 0–0.2)
BASOPHILS RELATIVE PERCENT: 0.9 % (ref 0–2)
BUN BLDV-MCNC: 18 MG/DL (ref 8–23)
CALCIUM SERPL-MCNC: 8.7 MG/DL (ref 8.6–10.2)
CHLORIDE BLD-SCNC: 103 MMOL/L (ref 98–107)
CO2: 23 MMOL/L (ref 22–29)
CREAT SERPL-MCNC: 0.8 MG/DL (ref 0.7–1.2)
EOSINOPHILS ABSOLUTE: 0.21 E9/L (ref 0.05–0.5)
EOSINOPHILS RELATIVE PERCENT: 4.5 % (ref 0–6)
GFR AFRICAN AMERICAN: >60
GFR NON-AFRICAN AMERICAN: >60 ML/MIN/1.73
GLUCOSE BLD-MCNC: 141 MG/DL (ref 74–99)
HCT VFR BLD CALC: 38.7 % (ref 37–54)
HEMOGLOBIN: 12.7 G/DL (ref 12.5–16.5)
IMMATURE GRANULOCYTES #: 0.04 E9/L
IMMATURE GRANULOCYTES %: 0.9 % (ref 0–5)
LYMPHOCYTES ABSOLUTE: 0.83 E9/L (ref 1.5–4)
LYMPHOCYTES RELATIVE PERCENT: 17.7 % (ref 20–42)
MCH RBC QN AUTO: 31.4 PG (ref 26–35)
MCHC RBC AUTO-ENTMCNC: 32.8 % (ref 32–34.5)
MCV RBC AUTO: 95.6 FL (ref 80–99.9)
MONOCYTES ABSOLUTE: 0.57 E9/L (ref 0.1–0.95)
MONOCYTES RELATIVE PERCENT: 12.2 % (ref 2–12)
NEUTROPHILS ABSOLUTE: 3 E9/L (ref 1.8–7.3)
NEUTROPHILS RELATIVE PERCENT: 63.8 % (ref 43–80)
PDW BLD-RTO: 12.6 FL (ref 11.5–15)
PLATELET # BLD: 253 E9/L (ref 130–450)
PMV BLD AUTO: 10.3 FL (ref 7–12)
POTASSIUM REFLEX MAGNESIUM: 3.8 MMOL/L (ref 3.5–5)
RBC # BLD: 4.05 E12/L (ref 3.8–5.8)
SODIUM BLD-SCNC: 139 MMOL/L (ref 132–146)
WBC # BLD: 4.7 E9/L (ref 4.5–11.5)

## 2019-04-02 PROCEDURE — 2500000003 HC RX 250 WO HCPCS: Performed by: STUDENT IN AN ORGANIZED HEALTH CARE EDUCATION/TRAINING PROGRAM

## 2019-04-02 PROCEDURE — 6360000002 HC RX W HCPCS: Performed by: STUDENT IN AN ORGANIZED HEALTH CARE EDUCATION/TRAINING PROGRAM

## 2019-04-02 PROCEDURE — 6370000000 HC RX 637 (ALT 250 FOR IP): Performed by: SURGERY

## 2019-04-02 PROCEDURE — 6360000002 HC RX W HCPCS: Performed by: SURGERY

## 2019-04-02 PROCEDURE — 6370000000 HC RX 637 (ALT 250 FOR IP): Performed by: STUDENT IN AN ORGANIZED HEALTH CARE EDUCATION/TRAINING PROGRAM

## 2019-04-02 PROCEDURE — 85025 COMPLETE CBC W/AUTO DIFF WBC: CPT

## 2019-04-02 PROCEDURE — 6370000000 HC RX 637 (ALT 250 FOR IP): Performed by: NEUROLOGICAL SURGERY

## 2019-04-02 PROCEDURE — 6370000000 HC RX 637 (ALT 250 FOR IP): Performed by: NURSE PRACTITIONER

## 2019-04-02 PROCEDURE — 80048 BASIC METABOLIC PNL TOTAL CA: CPT

## 2019-04-02 PROCEDURE — 36415 COLL VENOUS BLD VENIPUNCTURE: CPT

## 2019-04-02 PROCEDURE — 99238 HOSP IP/OBS DSCHRG MGMT 30/<: CPT | Performed by: SURGERY

## 2019-04-02 PROCEDURE — 2580000003 HC RX 258: Performed by: STUDENT IN AN ORGANIZED HEALTH CARE EDUCATION/TRAINING PROGRAM

## 2019-04-02 RX ADMIN — PANTOPRAZOLE SODIUM 40 MG: 40 TABLET, DELAYED RELEASE ORAL at 06:50

## 2019-04-02 RX ADMIN — METHOCARBAMOL TABLETS 1000 MG: 500 TABLET, COATED ORAL at 08:40

## 2019-04-02 RX ADMIN — LOSARTAN POTASSIUM 25 MG: 25 TABLET, FILM COATED ORAL at 08:40

## 2019-04-02 RX ADMIN — Medication 10 ML: at 08:40

## 2019-04-02 RX ADMIN — OXYCODONE HYDROCHLORIDE 5 MG: 5 TABLET ORAL at 10:54

## 2019-04-02 RX ADMIN — ACETAMINOPHEN 650 MG: 325 TABLET, FILM COATED ORAL at 10:01

## 2019-04-02 RX ADMIN — FOLIC ACID 1 MG: 5 INJECTION, SOLUTION INTRAMUSCULAR; INTRAVENOUS; SUBCUTANEOUS at 08:39

## 2019-04-02 RX ADMIN — THIAMINE HYDROCHLORIDE 100 MG: 100 INJECTION, SOLUTION INTRAMUSCULAR; INTRAVENOUS at 08:39

## 2019-04-02 RX ADMIN — ENOXAPARIN SODIUM 30 MG: 30 INJECTION SUBCUTANEOUS at 08:39

## 2019-04-02 RX ADMIN — BUTALBITAL, ACETAMINOPHEN, AND CAFFEINE 1 TABLET: 50; 325; 40 TABLET ORAL at 06:50

## 2019-04-02 RX ADMIN — ACETAMINOPHEN 650 MG: 325 TABLET, FILM COATED ORAL at 03:53

## 2019-04-02 RX ADMIN — LEVETIRACETAM 500 MG: 500 TABLET, FILM COATED ORAL at 08:39

## 2019-04-02 RX ADMIN — CLONIDINE HYDROCHLORIDE 0.2 MG: 0.2 TABLET ORAL at 08:39

## 2019-04-02 RX ADMIN — BUTALBITAL, ACETAMINOPHEN, AND CAFFEINE 1 TABLET: 50; 325; 40 TABLET ORAL at 01:32

## 2019-04-02 RX ADMIN — DIVALPROEX SODIUM 500 MG: 500 TABLET, EXTENDED RELEASE ORAL at 08:39

## 2019-04-02 ASSESSMENT — PAIN SCALES - GENERAL
PAINLEVEL_OUTOF10: 10
PAINLEVEL_OUTOF10: 8
PAINLEVEL_OUTOF10: 10
PAINLEVEL_OUTOF10: 5
PAINLEVEL_OUTOF10: 9
PAINLEVEL_OUTOF10: 10
PAINLEVEL_OUTOF10: 10

## 2019-04-02 ASSESSMENT — PAIN - FUNCTIONAL ASSESSMENT: PAIN_FUNCTIONAL_ASSESSMENT: ACTIVITIES ARE NOT PREVENTED

## 2019-04-02 ASSESSMENT — PAIN DESCRIPTION - PAIN TYPE
TYPE: ACUTE PAIN
TYPE: ACUTE PAIN

## 2019-04-02 ASSESSMENT — PAIN DESCRIPTION - PROGRESSION
CLINICAL_PROGRESSION: NOT CHANGED

## 2019-04-02 ASSESSMENT — PAIN DESCRIPTION - LOCATION
LOCATION: HEAD
LOCATION: HEAD

## 2019-04-02 ASSESSMENT — PAIN DESCRIPTION - DESCRIPTORS
DESCRIPTORS: HEADACHE;POUNDING;THROBBING
DESCRIPTORS: ACHING;CONSTANT

## 2019-04-02 ASSESSMENT — PAIN DESCRIPTION - ONSET
ONSET: ON-GOING
ONSET: ON-GOING

## 2019-04-02 ASSESSMENT — PAIN DESCRIPTION - FREQUENCY
FREQUENCY: CONTINUOUS
FREQUENCY: CONTINUOUS

## 2019-04-02 NOTE — CARE COORDINATION
Call received from [facility] with [employer]. She spoke with [facility] and provided the information they needed to contact the company for payment for the medications. Await receipt of authorization for medications to be covered and discharge orders to be placed by admitting team.  Updated notes and discharge orders faxed to [facility] a [phone]. Will continue to follow.      Andrea Harmon.

## 2019-04-02 NOTE — PROGRESS NOTES
[facility]   ATTENDING PHYSICIAN PROGRESS NOTE     I have examined the patient, reviewed the record, and discussed the case with the APN/ Resident. I have reviewed all relevant labs and imaging data. Please refer to the APN/ resident's note. I agree with the assessment and plan with the following corrections/ additions. The following summarizes my clinical findings and independent assessment. CC: fall; SDH    Hospital Course/Overnight Events:  3/26--admitted after found down; found to have SDH with midline shift  3/27--no new issues overnight  3/28--ongoing headache/nausea overnight  3/29--transferred out of SICU yesterday  4/1--nothing new  4/2--no new isssues    Pt states headache persists.      Awake and alert  Follows commands  Hrt:  Regular  Lungs:  Fairly clear bilaterally  Abd:  Soft; BS+; NT/ND  Skin:  Warm/dry  Ext:  No sensorimotor deficits    Patient Active Problem List    Diagnosis Date Noted    Hypokalemia     Electrolyte imbalance     Subdural hematoma ([facility] [address] 03/26/2019    Fracture of occipital bone of skull with loss of consciousness ([facility] [address] 03/26/2019       S/p found down  SDH--monitor neuro exam  Diet as tolerated  PT/OT evals  DVT risk--PCDs  Discharge planning       Negrita Cruz MD, FACS  4/2/2019  12:40 PM

## 2019-04-02 NOTE — PROGRESS NOTES
Patient to discharge home to [place]  With girlfriend. Explained the importance of follow up with PCP and Neurosurgeon when finally home. Working with their  to assure this follow through.  Patient and girlfriend verbalize understanding

## 2019-04-02 NOTE — CARE COORDINATION
Call received late last evening from Joe carreon at [address] [facility] in [facility]. She was unable to reach anyone at [facility] for the [facility] number and other information she needs to fill the prescriptions for the patient for discharge. Call placed to Sania Adame,  with [employer] and  left re: information needed for [facility] pharmacy. Phone number left for [facility] pharmacy and call back number left for this CM as well. Anticipate discharge to home in [place] with home health care today. Will continue to follow.      Ghada Soto.

## 2019-04-02 NOTE — PROGRESS NOTES
Department of Neurosurgery  Progress Note    CHIEF COMPLAINT: SDH, dizziness    SUBJECTIVE:  Awake. HA better. Planning for discharge today. No new issues overnight. REVIEW OF SYSTEMS :  Constitutional: Negative for chills and fever. Neurological: Negative for dizziness, tremors and speech change. OBJECTIVE:   VITALS:  BP (!) 158/90   Pulse 67   Temp 98 °F (36.7 °C) (Temporal)   Resp 16   Ht 5' 7\" (1.702 m)   Wt 230 lb (104.3 kg)   SpO2 92%   BMI 36.02 kg/m²     PHYSICAL:  Constitutional: He appears well-developed and well-nourished. HENT:   Head: Normocephalic. Eyes: Pupils are equal, round, and reactive to light. EOM are normal.   Neck: No tracheal deviation present. Cardiovascular: Normal rate. Pulmonary/Chest: Effort normal.   Abdominal: He exhibits no distension. Musculoskeletal: Normal range of motion. Neurological:    Alert   Face symmetric   No pronator drift   Motor strength full   Sensation intact to light touch    Skin: Skin is warm and dry.    Psychiatric: Thought content normal.            DATA:  CBC:   Lab Results   Component Value Date    WBC 4.7 04/02/2019    RBC 4.05 04/02/2019    HGB 12.7 04/02/2019    HCT 38.7 04/02/2019    MCV 95.6 04/02/2019    MCH 31.4 04/02/2019    MCHC 32.8 04/02/2019    RDW 12.6 04/02/2019     04/02/2019    MPV 10.3 04/02/2019     BMP:    Lab Results   Component Value Date     04/01/2019    K 4.1 04/01/2019     04/01/2019    CO2 25 04/01/2019    BUN 17 04/01/2019    LABALBU 3.8 03/29/2019    CREATININE 0.8 04/01/2019    CALCIUM 8.7 04/01/2019    GFRAA >60 04/01/2019    LABGLOM >60 04/01/2019    GLUCOSE 125 04/01/2019     PT/INR:    Lab Results   Component Value Date    PROTIME 11.4 03/26/2019    INR 1.0 03/26/2019     PTT:    Lab Results   Component Value Date    APTT 25.6 03/26/2019   [APTT}    Current Inpatient Medications  Current Facility-Administered Medications: butalbital-acetaminophen-caffeine (FIORICET, ESGIC) per tablet 1 tablet, 1 tablet, Oral, Q6H  acetaminophen (TYLENOL) tablet 650 mg, 650 mg, Oral, Q6H PRN  cloNIDine (CATAPRES) tablet 0.2 mg, 0.2 mg, Oral, BID  pantoprazole (PROTONIX) tablet 40 mg, 40 mg, Oral, QAM AC  levETIRAcetam (KEPPRA) tablet 500 mg, 500 mg, Oral, BID  losartan (COZAAR) tablet 25 mg, 25 mg, Oral, Daily  methocarbamol (ROBAXIN) tablet 1,000 mg, 1,000 mg, Oral, 4x Daily  enoxaparin (LOVENOX) injection 30 mg, 30 mg, Subcutaneous, BID  divalproex (DEPAKOTE ER) extended release tablet 500 mg, 500 mg, Oral, Daily  scopolamine (TRANSDERM-SCOP) transdermal patch 1 patch, 1 patch, Transdermal, Q72H  potassium bicarb-citric acid (EFFER-K) effervescent tablet 40 mEq, 40 mEq, Oral, Daily  trimethobenzamide (TIGAN) injection 200 mg, 200 mg, Intramuscular, Q6H PRN  hydrALAZINE (APRESOLINE) injection 10 mg, 10 mg, Intravenous, Q30 Min PRN  labetalol (NORMODYNE;TRANDATE) injection 10 mg, 10 mg, Intravenous, Q30 Min PRN  sodium chloride flush 0.9 % injection 10 mL, 10 mL, Intravenous, 2 times per day  sodium chloride flush 0.9 % injection 10 mL, 10 mL, Intravenous, PRN  magnesium hydroxide (MILK OF MAGNESIA) 400 MG/5ML suspension 30 mL, 30 mL, Oral, Daily PRN  ondansetron (ZOFRAN) injection 4 mg, 4 mg, Intravenous, Q6H PRN  nicotine (NICODERM CQ) 21 MG/24HR 1 patch, 1 patch, Transdermal, Daily  thiamine (B-1) injection 100 mg, 100 mg, Intravenous, Daily  folic acid injection 1 mg, 1 mg, Intravenous, Daily  oxyCODONE (ROXICODONE) immediate release tablet 5 mg, 5 mg, Oral, Q4H PRN  perflutren lipid microspheres (DEFINITY) injection 1.65 mg, 1.5 mL, Intravenous, ONCE PRN    ASSESSMENT:   New problem: right frontal-temporal SDH s/p fall     Danielle Sibley is 66years old. He presented with a syncopal episode. He was found passed out in his truck. He hit the back of his head. He suffered an occipital bone fracture. He has a right frontotemporal subdural hematoma measuring 8 mm in thickness.   There is 7 mm of midline shift. The basal cisterns are patent. He is currently neurologically intact. No current surgical intervention is planned. 3/26 HCT: right frontal and temporal SDH measuring 8mm thick with local sulcal effacement and 7mm midline shift. The basal cisterns are patent. Non-depressed occipital bone fx.     3/26 CT C spine- no fx    He is currently asymptomatic from the subdural hemorrhage. 3/26 Repeat HCT: stable. PLAN:  -No surgical intervention needed if neurological exam remains without any deficits. -SBP goal < 140.   -Avoid hyponatremia  -Serial neurological exams  -Follow up in neurosurgery clinic in 1 month with repeat head CT. He is from [place] and plans to follow up with a local Neurosurgeon.   -keppra 500mg bid for 1 week. -depakote ER ordered for 2 weeks for headache.  -discharge planning.      Electronically signed by Michael Connors PA-C on 4/2/2019 at 7:30 AM

## 2021-06-21 NOTE — PLAN OF CARE
Left message for the patient to call me back Problem: Falls - Risk of:  Goal: Will remain free from falls  Description  Will remain free from falls  Outcome: Met This Shift  Goal: Absence of physical injury  Description  Absence of physical injury  Outcome: Met This Shift     Problem: Pain:  Goal: Pain level will decrease  Description  Pain level will decrease  Outcome: Not Met This Shift